# Patient Record
Sex: FEMALE | Race: WHITE | Employment: OTHER | URBAN - METROPOLITAN AREA
[De-identification: names, ages, dates, MRNs, and addresses within clinical notes are randomized per-mention and may not be internally consistent; named-entity substitution may affect disease eponyms.]

---

## 2024-01-01 ENCOUNTER — APPOINTMENT (OUTPATIENT)
Dept: CT IMAGING | Facility: HOSPITAL | Age: 89
End: 2024-01-01
Attending: INTERNAL MEDICINE
Payer: MEDICARE

## 2024-01-01 ENCOUNTER — HOSPITAL ENCOUNTER (INPATIENT)
Facility: HOSPITAL | Age: 89
LOS: 3 days | End: 2024-01-01
Attending: EMERGENCY MEDICINE | Admitting: INTERNAL MEDICINE
Payer: MEDICARE

## 2024-01-01 ENCOUNTER — APPOINTMENT (OUTPATIENT)
Dept: INTERVENTIONAL RADIOLOGY/VASCULAR | Facility: HOSPITAL | Age: 89
End: 2024-01-01
Attending: SURGERY
Payer: MEDICARE

## 2024-01-01 ENCOUNTER — APPOINTMENT (OUTPATIENT)
Dept: GENERAL RADIOLOGY | Facility: HOSPITAL | Age: 89
End: 2024-01-01
Attending: INTERNAL MEDICINE
Payer: MEDICARE

## 2024-01-01 ENCOUNTER — APPOINTMENT (OUTPATIENT)
Dept: CV DIAGNOSTICS | Facility: HOSPITAL | Age: 89
End: 2024-01-01
Attending: HOSPITALIST
Payer: MEDICARE

## 2024-01-01 ENCOUNTER — APPOINTMENT (OUTPATIENT)
Dept: CT IMAGING | Facility: HOSPITAL | Age: 89
End: 2024-01-01
Attending: EMERGENCY MEDICINE
Payer: MEDICARE

## 2024-01-01 ENCOUNTER — ANESTHESIA (OUTPATIENT)
Dept: INTERVENTIONAL RADIOLOGY/VASCULAR | Facility: HOSPITAL | Age: 89
End: 2024-01-01
Payer: MEDICARE

## 2024-01-01 VITALS
BODY MASS INDEX: 28.35 KG/M2 | OXYGEN SATURATION: 92 % | RESPIRATION RATE: 27 BRPM | TEMPERATURE: 95 F | DIASTOLIC BLOOD PRESSURE: 38 MMHG | WEIGHT: 144.38 LBS | HEIGHT: 60 IN | SYSTOLIC BLOOD PRESSURE: 52 MMHG

## 2024-01-01 DIAGNOSIS — R11.2 NAUSEA VOMITING AND DIARRHEA: ICD-10-CM

## 2024-01-01 DIAGNOSIS — I70.8 OCCLUSION OF CELIAC ARTERY: ICD-10-CM

## 2024-01-01 DIAGNOSIS — R19.7 NAUSEA VOMITING AND DIARRHEA: ICD-10-CM

## 2024-01-01 DIAGNOSIS — I71.02 ABDOMINAL AORTIC ANEURYSM DISSECTION (HCC): ICD-10-CM

## 2024-01-01 DIAGNOSIS — I71.00 INTRAMURAL AORTIC HEMATOMA (HCC): Primary | ICD-10-CM

## 2024-01-01 DIAGNOSIS — K55.069 SUPERIOR MESENTERIC ARTERY THROMBOSIS (HCC): ICD-10-CM

## 2024-01-01 LAB
ALBUMIN SERPL-MCNC: 3.2 G/DL (ref 3.2–4.8)
ALBUMIN SERPL-MCNC: 3.5 G/DL (ref 3.2–4.8)
ALBUMIN SERPL-MCNC: 4.5 G/DL (ref 3.2–4.8)
ALBUMIN/GLOB SERPL: 1.6 {RATIO} (ref 1–2)
ALBUMIN/GLOB SERPL: 1.6 {RATIO} (ref 1–2)
ALP LIVER SERPL-CCNC: 60 U/L
ALP LIVER SERPL-CCNC: 62 U/L
ALP LIVER SERPL-CCNC: 62 U/L
ALT SERPL-CCNC: 1389 U/L
ALT SERPL-CCNC: 1633 U/L
ALT SERPL-CCNC: 54 U/L
ALT SERPL-CCNC: 63 U/L
ANION GAP SERPL CALC-SCNC: 10 MMOL/L (ref 0–18)
ANION GAP SERPL CALC-SCNC: 14 MMOL/L (ref 0–18)
ANION GAP SERPL CALC-SCNC: 17 MMOL/L (ref 0–18)
ANION GAP SERPL CALC-SCNC: 9 MMOL/L (ref 0–18)
ANTIBODY SCREEN: NEGATIVE
APTT PPP: 113.6 SECONDS (ref 23.3–35.6)
APTT PPP: 166.4 SECONDS (ref 23.3–35.6)
APTT PPP: 206.1 SECONDS (ref 23.3–35.6)
APTT PPP: 28.5 SECONDS (ref 23.3–35.6)
APTT PPP: 92.2 SECONDS (ref 23.3–35.6)
APTT PPP: >240 SECONDS (ref 23.3–35.6)
AST SERPL-CCNC: 1652 U/L (ref ?–34)
AST SERPL-CCNC: 3079 U/L (ref ?–34)
AST SERPL-CCNC: 96 U/L (ref ?–34)
ATRIAL RATE: 105 BPM
ATRIAL RATE: 97 BPM
BASOPHILS # BLD AUTO: 0.02 X10(3) UL (ref 0–0.2)
BASOPHILS # BLD AUTO: 0.04 X10(3) UL (ref 0–0.2)
BASOPHILS NFR BLD AUTO: 0.2 %
BASOPHILS NFR BLD AUTO: 0.3 %
BILIRUB DIRECT SERPL-MCNC: 0.5 MG/DL (ref ?–0.3)
BILIRUB DIRECT SERPL-MCNC: 0.6 MG/DL (ref ?–0.3)
BILIRUB SERPL-MCNC: 1.7 MG/DL (ref 0.2–1.1)
BILIRUB SERPL-MCNC: 2 MG/DL (ref 0.2–1.1)
BILIRUB SERPL-MCNC: 2.5 MG/DL (ref 0.2–1.1)
BUN BLD-MCNC: 21 MG/DL (ref 9–23)
BUN BLD-MCNC: 21 MG/DL (ref 9–23)
BUN BLD-MCNC: 22 MG/DL (ref 9–23)
BUN BLD-MCNC: 22 MG/DL (ref 9–23)
BUN BLD-MCNC: 32 MG/DL (ref 9–23)
BUN/CREAT SERPL: 30.1 (ref 10–20)
BUN/CREAT SERPL: 34.4 (ref 10–20)
BUN/CREAT SERPL: 41 (ref 10–20)
C DIFF TOX B STL QL: NEGATIVE
CALCIUM BLD-MCNC: 8 MG/DL (ref 8.7–10.4)
CALCIUM BLD-MCNC: 8.3 MG/DL (ref 8.7–10.4)
CALCIUM BLD-MCNC: 8.6 MG/DL (ref 8.7–10.4)
CALCIUM BLD-MCNC: 9.3 MG/DL (ref 8.7–10.4)
CHLORIDE SERPL-SCNC: 108 MMOL/L (ref 98–112)
CHLORIDE SERPL-SCNC: 109 MMOL/L (ref 98–112)
CHLORIDE SERPL-SCNC: 111 MMOL/L (ref 98–112)
CHLORIDE SERPL-SCNC: 113 MMOL/L (ref 98–112)
CHOLEST SERPL-MCNC: 166 MG/DL (ref ?–200)
CO2 SERPL-SCNC: 14 MMOL/L (ref 21–32)
CO2 SERPL-SCNC: 18 MMOL/L (ref 21–32)
CO2 SERPL-SCNC: 19 MMOL/L (ref 21–32)
CO2 SERPL-SCNC: 20 MMOL/L (ref 21–32)
CREAT BLD-MCNC: 0.64 MG/DL
CREAT BLD-MCNC: 0.73 MG/DL
CREAT BLD-MCNC: 0.75 MG/DL
CREAT BLD-MCNC: 0.78 MG/DL
DEPRECATED RDW RBC AUTO: 43.2 FL (ref 35.1–46.3)
DEPRECATED RDW RBC AUTO: 43.8 FL (ref 35.1–46.3)
DEPRECATED RDW RBC AUTO: 45.6 FL (ref 35.1–46.3)
DEPRECATED RDW RBC AUTO: 45.9 FL (ref 35.1–46.3)
DEPRECATED RDW RBC AUTO: 47.2 FL (ref 35.1–46.3)
EGFRCR SERPLBLD CKD-EPI 2021: 73 ML/MIN/1.73M2 (ref 60–?)
EGFRCR SERPLBLD CKD-EPI 2021: 77 ML/MIN/1.73M2 (ref 60–?)
EGFRCR SERPLBLD CKD-EPI 2021: 79 ML/MIN/1.73M2 (ref 60–?)
EGFRCR SERPLBLD CKD-EPI 2021: 85 ML/MIN/1.73M2 (ref 60–?)
EOSINOPHIL # BLD AUTO: 0 X10(3) UL (ref 0–0.7)
EOSINOPHIL # BLD AUTO: 0.02 X10(3) UL (ref 0–0.7)
EOSINOPHIL NFR BLD AUTO: 0 %
EOSINOPHIL NFR BLD AUTO: 0.1 %
ERYTHROCYTE [DISTWIDTH] IN BLOOD BY AUTOMATED COUNT: 13.5 % (ref 11–15)
ERYTHROCYTE [DISTWIDTH] IN BLOOD BY AUTOMATED COUNT: 13.5 % (ref 11–15)
ERYTHROCYTE [DISTWIDTH] IN BLOOD BY AUTOMATED COUNT: 13.7 % (ref 11–15)
ERYTHROCYTE [DISTWIDTH] IN BLOOD BY AUTOMATED COUNT: 14.5 % (ref 11–15)
ERYTHROCYTE [DISTWIDTH] IN BLOOD BY AUTOMATED COUNT: 15 % (ref 11–15)
GLOBULIN PLAS-MCNC: 2 G/DL (ref 2.8–4.4)
GLOBULIN PLAS-MCNC: 2.2 G/DL (ref 2.8–4.4)
GLUCOSE BLD-MCNC: 107 MG/DL (ref 70–99)
GLUCOSE BLD-MCNC: 138 MG/DL (ref 70–99)
GLUCOSE BLD-MCNC: 154 MG/DL (ref 70–99)
GLUCOSE BLD-MCNC: 94 MG/DL (ref 70–99)
HCT VFR BLD AUTO: 44.1 %
HCT VFR BLD AUTO: 44.2 %
HCT VFR BLD AUTO: 44.4 %
HCT VFR BLD AUTO: 45.4 %
HCT VFR BLD AUTO: 46.7 %
HDLC SERPL-MCNC: 64 MG/DL (ref 40–59)
HEMOCCULT STL QL: NEGATIVE
HGB BLD-MCNC: 13.5 G/DL
HGB BLD-MCNC: 13.8 G/DL
HGB BLD-MCNC: 14 G/DL
HGB BLD-MCNC: 14.4 G/DL
HGB BLD-MCNC: 14.7 G/DL
IMM GRANULOCYTES # BLD AUTO: 0.03 X10(3) UL (ref 0–1)
IMM GRANULOCYTES # BLD AUTO: 0.23 X10(3) UL (ref 0–1)
IMM GRANULOCYTES NFR BLD: 0.4 %
IMM GRANULOCYTES NFR BLD: 1.5 %
INR BLD: 1.78 (ref 0.8–1.2)
LACTATE SERPL-SCNC: 10.3 MMOL/L (ref 0.5–2)
LACTATE SERPL-SCNC: 4.6 MMOL/L (ref 0.5–2)
LACTATE SERPL-SCNC: 6.1 MMOL/L (ref 0.5–2)
LACTATE SERPL-SCNC: 8.5 MMOL/L (ref 0.5–2)
LDLC SERPL CALC-MCNC: 86 MG/DL (ref ?–100)
LIPASE SERPL-CCNC: 56 U/L (ref 13–75)
LYMPHOCYTES # BLD AUTO: 0.82 X10(3) UL (ref 1–4)
LYMPHOCYTES # BLD AUTO: 0.99 X10(3) UL (ref 1–4)
LYMPHOCYTES NFR BLD AUTO: 11.9 %
LYMPHOCYTES NFR BLD AUTO: 5.3 %
MAGNESIUM SERPL-MCNC: 2.1 MG/DL (ref 1.6–2.6)
MCH RBC QN AUTO: 27.6 PG (ref 26–34)
MCH RBC QN AUTO: 27.6 PG (ref 26–34)
MCH RBC QN AUTO: 27.9 PG (ref 26–34)
MCH RBC QN AUTO: 27.9 PG (ref 26–34)
MCH RBC QN AUTO: 28.1 PG (ref 26–34)
MCHC RBC AUTO-ENTMCNC: 30.4 G/DL (ref 31–37)
MCHC RBC AUTO-ENTMCNC: 31.3 G/DL (ref 31–37)
MCHC RBC AUTO-ENTMCNC: 31.5 G/DL (ref 31–37)
MCHC RBC AUTO-ENTMCNC: 31.7 G/DL (ref 31–37)
MCHC RBC AUTO-ENTMCNC: 31.7 G/DL (ref 31–37)
MCV RBC AUTO: 87 FL
MCV RBC AUTO: 88 FL
MCV RBC AUTO: 88.2 FL
MCV RBC AUTO: 88.6 FL
MCV RBC AUTO: 92.5 FL
MONOCYTES # BLD AUTO: 0.15 X10(3) UL (ref 0.1–1)
MONOCYTES # BLD AUTO: 1.64 X10(3) UL (ref 0.1–1)
MONOCYTES NFR BLD AUTO: 1.8 %
MONOCYTES NFR BLD AUTO: 10.6 %
MRSA DNA SPEC QL NAA+PROBE: NEGATIVE
NEUTROPHILS # BLD AUTO: 12.73 X10 (3) UL (ref 1.5–7.7)
NEUTROPHILS # BLD AUTO: 12.73 X10(3) UL (ref 1.5–7.7)
NEUTROPHILS # BLD AUTO: 7.14 X10 (3) UL (ref 1.5–7.7)
NEUTROPHILS # BLD AUTO: 7.14 X10(3) UL (ref 1.5–7.7)
NEUTROPHILS NFR BLD AUTO: 82.2 %
NEUTROPHILS NFR BLD AUTO: 85.7 %
NONHDLC SERPL-MCNC: 102 MG/DL (ref ?–130)
OSMOLALITY SERPL CALC.SUM OF ELEC: 297 MOSM/KG (ref 275–295)
OSMOLALITY SERPL CALC.SUM OF ELEC: 297 MOSM/KG (ref 275–295)
OSMOLALITY SERPL CALC.SUM OF ELEC: 300 MOSM/KG (ref 275–295)
P AXIS: 83 DEGREES
P AXIS: 88 DEGREES
P-R INTERVAL: 142 MS
P-R INTERVAL: 154 MS
PLATELET # BLD AUTO: 110 10(3)UL (ref 150–450)
PLATELET # BLD AUTO: 119 10(3)UL (ref 150–450)
PLATELET # BLD AUTO: 130 10(3)UL (ref 150–450)
PLATELET # BLD AUTO: 165 10(3)UL (ref 150–450)
PLATELET # BLD AUTO: 178 10(3)UL (ref 150–450)
PLATELETS.RETICULATED NFR BLD AUTO: 10.4 % (ref 0–7)
PLATELETS.RETICULATED NFR BLD AUTO: 16.9 % (ref 0–7)
PLATELETS.RETICULATED NFR BLD AUTO: 9.6 % (ref 0–7)
PMV BLD AUTO: 13.5 FL (ref 6.8–10.3)
POTASSIUM SERPL-SCNC: 3.6 MMOL/L (ref 3.5–5.1)
POTASSIUM SERPL-SCNC: 3.7 MMOL/L (ref 3.5–5.1)
POTASSIUM SERPL-SCNC: 3.8 MMOL/L (ref 3.5–5.1)
PROT SERPL-MCNC: 5.2 G/DL (ref 5.7–8.2)
PROT SERPL-MCNC: 5.7 G/DL (ref 5.7–8.2)
PROT SERPL-MCNC: 7.3 G/DL (ref 5.7–8.2)
PROTHROMBIN TIME: 21.8 SECONDS (ref 11.6–14.8)
Q-T INTERVAL: 272 MS
Q-T INTERVAL: 358 MS
Q-T INTERVAL: 406 MS
QRS DURATION: 86 MS
QRS DURATION: 88 MS
QRS DURATION: 96 MS
QTC CALCULATION (BEZET): 418 MS
QTC CALCULATION (BEZET): 473 MS
QTC CALCULATION (BEZET): 515 MS
R AXIS: 16 DEGREES
R AXIS: 2 DEGREES
R AXIS: 20 DEGREES
RBC # BLD AUTO: 4.8 X10(6)UL
RBC # BLD AUTO: 5 X10(6)UL
RBC # BLD AUTO: 5.02 X10(6)UL
RBC # BLD AUTO: 5.22 X10(6)UL
RBC # BLD AUTO: 5.27 X10(6)UL
RH BLOOD TYPE: POSITIVE
RH BLOOD TYPE: POSITIVE
SARS-COV-2 RNA RESP QL NAA+PROBE: NOT DETECTED
SODIUM SERPL-SCNC: 138 MMOL/L (ref 136–145)
SODIUM SERPL-SCNC: 140 MMOL/L (ref 136–145)
SODIUM SERPL-SCNC: 142 MMOL/L (ref 136–145)
SODIUM SERPL-SCNC: 142 MMOL/L (ref 136–145)
T AXIS: -37 DEGREES
T AXIS: 259 DEGREES
T AXIS: 50 DEGREES
TRIGL SERPL-MCNC: 87 MG/DL (ref 30–149)
TROPONIN I SERPL HS-MCNC: 42 NG/L
TROPONIN I SERPL HS-MCNC: 422 NG/L
TROPONIN I SERPL HS-MCNC: 435 NG/L
TROPONIN I SERPL HS-MCNC: 438 NG/L
VENTRICULAR RATE: 105 BPM
VENTRICULAR RATE: 142 BPM
VENTRICULAR RATE: 97 BPM
VLDLC SERPL CALC-MCNC: 14 MG/DL (ref 0–30)
WBC # BLD AUTO: 13.7 X10(3) UL (ref 4–11)
WBC # BLD AUTO: 15.5 X10(3) UL (ref 4–11)
WBC # BLD AUTO: 17.7 X10(3) UL (ref 4–11)
WBC # BLD AUTO: 8.3 X10(3) UL (ref 4–11)
WBC # BLD AUTO: 9.1 X10(3) UL (ref 4–11)

## 2024-01-01 PROCEDURE — 85730 THROMBOPLASTIN TIME PARTIAL: CPT | Performed by: INTERNAL MEDICINE

## 2024-01-01 PROCEDURE — 86901 BLOOD TYPING SEROLOGIC RH(D): CPT | Performed by: SURGERY

## 2024-01-01 PROCEDURE — 80053 COMPREHEN METABOLIC PANEL: CPT | Performed by: INTERNAL MEDICINE

## 2024-01-01 PROCEDURE — 71045 X-RAY EXAM CHEST 1 VIEW: CPT | Performed by: INTERNAL MEDICINE

## 2024-01-01 PROCEDURE — 85027 COMPLETE CBC AUTOMATED: CPT | Performed by: SURGERY

## 2024-01-01 PROCEDURE — 80053 COMPREHEN METABOLIC PANEL: CPT | Performed by: HOSPITALIST

## 2024-01-01 PROCEDURE — 87641 MR-STAPH DNA AMP PROBE: CPT | Performed by: HOSPITALIST

## 2024-01-01 PROCEDURE — 82248 BILIRUBIN DIRECT: CPT | Performed by: INTERNAL MEDICINE

## 2024-01-01 PROCEDURE — 83605 ASSAY OF LACTIC ACID: CPT | Performed by: EMERGENCY MEDICINE

## 2024-01-01 PROCEDURE — 37237 OPEN/PERQ PLACE STENT EA ADD: CPT | Performed by: SURGERY

## 2024-01-01 PROCEDURE — 85730 THROMBOPLASTIN TIME PARTIAL: CPT | Performed by: EMERGENCY MEDICINE

## 2024-01-01 PROCEDURE — 96375 TX/PRO/DX INJ NEW DRUG ADDON: CPT

## 2024-01-01 PROCEDURE — 86901 BLOOD TYPING SEROLOGIC RH(D): CPT | Performed by: EMERGENCY MEDICINE

## 2024-01-01 PROCEDURE — 84484 ASSAY OF TROPONIN QUANT: CPT | Performed by: HOSPITALIST

## 2024-01-01 PROCEDURE — 86850 RBC ANTIBODY SCREEN: CPT | Performed by: EMERGENCY MEDICINE

## 2024-01-01 PROCEDURE — 93010 ELECTROCARDIOGRAM REPORT: CPT | Performed by: INTERNAL MEDICINE

## 2024-01-01 PROCEDURE — B41F1ZZ FLUOROSCOPY OF RIGHT LOWER EXTREMITY ARTERIES USING LOW OSMOLAR CONTRAST: ICD-10-PCS | Performed by: SURGERY

## 2024-01-01 PROCEDURE — 83690 ASSAY OF LIPASE: CPT | Performed by: HOSPITALIST

## 2024-01-01 PROCEDURE — 85610 PROTHROMBIN TIME: CPT | Performed by: HOSPITALIST

## 2024-01-01 PROCEDURE — 80048 BASIC METABOLIC PNL TOTAL CA: CPT | Performed by: EMERGENCY MEDICINE

## 2024-01-01 PROCEDURE — 85025 COMPLETE CBC W/AUTO DIFF WBC: CPT | Performed by: HOSPITALIST

## 2024-01-01 PROCEDURE — 93306 TTE W/DOPPLER COMPLETE: CPT | Performed by: HOSPITALIST

## 2024-01-01 PROCEDURE — X2CP3T7 EXTIRPATION OF MATTER FROM ABDOMINAL AORTA USING COMPUTER-AIDED MECHANICAL ASPIRATION, PERCUTANEOUS APPROACH, NEW TECHNOLOGY GROUP 7: ICD-10-PCS | Performed by: SURGERY

## 2024-01-01 PROCEDURE — X2CY3T7 EXTIRPATION OF MATTER FROM GREAT VESSEL USING COMPUTER-AIDED MECHANICAL ASPIRATION, PERCUTANEOUS APPROACH, NEW TECHNOLOGY GROUP 7: ICD-10-PCS | Performed by: SURGERY

## 2024-01-01 PROCEDURE — 84484 ASSAY OF TROPONIN QUANT: CPT | Performed by: INTERNAL MEDICINE

## 2024-01-01 PROCEDURE — B44FZZ3 ULTRASONOGRAPHY OF RIGHT LOWER EXTREMITY ARTERIES, INTRAVASCULAR: ICD-10-PCS | Performed by: SURGERY

## 2024-01-01 PROCEDURE — 99285 EMERGENCY DEPT VISIT HI MDM: CPT

## 2024-01-01 PROCEDURE — 37184 PRIM ART M-THRMBC 1ST VSL: CPT | Performed by: SURGERY

## 2024-01-01 PROCEDURE — 93005 ELECTROCARDIOGRAM TRACING: CPT

## 2024-01-01 PROCEDURE — 85025 COMPLETE CBC W/AUTO DIFF WBC: CPT | Performed by: EMERGENCY MEDICINE

## 2024-01-01 PROCEDURE — 96361 HYDRATE IV INFUSION ADD-ON: CPT

## 2024-01-01 PROCEDURE — 87427 SHIGA-LIKE TOXIN AG IA: CPT | Performed by: EMERGENCY MEDICINE

## 2024-01-01 PROCEDURE — 37236 OPEN/PERQ PLACE STENT 1ST: CPT | Performed by: SURGERY

## 2024-01-01 PROCEDURE — 85027 COMPLETE CBC AUTOMATED: CPT | Performed by: INTERNAL MEDICINE

## 2024-01-01 PROCEDURE — 83605 ASSAY OF LACTIC ACID: CPT | Performed by: HOSPITALIST

## 2024-01-01 PROCEDURE — 36245 INS CATH ABD/L-EXT ART 1ST: CPT | Performed by: SURGERY

## 2024-01-01 PROCEDURE — 85730 THROMBOPLASTIN TIME PARTIAL: CPT | Performed by: HOSPITALIST

## 2024-01-01 PROCEDURE — 96374 THER/PROPH/DIAG INJ IV PUSH: CPT

## 2024-01-01 PROCEDURE — 87045 FECES CULTURE AEROBIC BACT: CPT | Performed by: EMERGENCY MEDICINE

## 2024-01-01 PROCEDURE — 74177 CT ABD & PELVIS W/CONTRAST: CPT | Performed by: EMERGENCY MEDICINE

## 2024-01-01 PROCEDURE — 85730 THROMBOPLASTIN TIME PARTIAL: CPT | Performed by: SURGERY

## 2024-01-01 PROCEDURE — 85027 COMPLETE CBC AUTOMATED: CPT | Performed by: HOSPITALIST

## 2024-01-01 PROCEDURE — 87493 C DIFF AMPLIFIED PROBE: CPT | Performed by: EMERGENCY MEDICINE

## 2024-01-01 PROCEDURE — 83735 ASSAY OF MAGNESIUM: CPT | Performed by: HOSPITALIST

## 2024-01-01 PROCEDURE — B4141ZZ FLUOROSCOPY OF SUPERIOR MESENTERIC ARTERY USING LOW OSMOLAR CONTRAST: ICD-10-PCS | Performed by: SURGERY

## 2024-01-01 PROCEDURE — 99291 CRITICAL CARE FIRST HOUR: CPT

## 2024-01-01 PROCEDURE — 36247 INS CATH ABD/L-EXT ART 3RD: CPT | Performed by: SURGERY

## 2024-01-01 PROCEDURE — 83605 ASSAY OF LACTIC ACID: CPT | Performed by: SURGERY

## 2024-01-01 PROCEDURE — 86900 BLOOD TYPING SEROLOGIC ABO: CPT | Performed by: SURGERY

## 2024-01-01 PROCEDURE — 80076 HEPATIC FUNCTION PANEL: CPT | Performed by: EMERGENCY MEDICINE

## 2024-01-01 PROCEDURE — 82272 OCCULT BLD FECES 1-3 TESTS: CPT | Performed by: EMERGENCY MEDICINE

## 2024-01-01 PROCEDURE — 83690 ASSAY OF LIPASE: CPT | Performed by: EMERGENCY MEDICINE

## 2024-01-01 PROCEDURE — 87077 CULTURE AEROBIC IDENTIFY: CPT | Performed by: EMERGENCY MEDICINE

## 2024-01-01 PROCEDURE — 80048 BASIC METABOLIC PNL TOTAL CA: CPT | Performed by: SURGERY

## 2024-01-01 PROCEDURE — 74174 CTA ABD&PLVS W/CONTRAST: CPT | Performed by: INTERNAL MEDICINE

## 2024-01-01 PROCEDURE — 87046 STOOL CULTR AEROBIC BACT EA: CPT | Performed by: EMERGENCY MEDICINE

## 2024-01-01 PROCEDURE — 96376 TX/PRO/DX INJ SAME DRUG ADON: CPT

## 2024-01-01 PROCEDURE — 37185 PRIM ART M-THRMBC SBSQ VSL: CPT | Performed by: SURGERY

## 2024-01-01 PROCEDURE — 37252 INTRVASC US NONCORONARY 1ST: CPT | Performed by: SURGERY

## 2024-01-01 PROCEDURE — 86850 RBC ANTIBODY SCREEN: CPT | Performed by: SURGERY

## 2024-01-01 PROCEDURE — 83735 ASSAY OF MAGNESIUM: CPT | Performed by: EMERGENCY MEDICINE

## 2024-01-01 PROCEDURE — 80061 LIPID PANEL: CPT | Performed by: INTERNAL MEDICINE

## 2024-01-01 PROCEDURE — X2CS3T7 EXTIRPATION OF MATTER FROM RIGHT LOWER EXTREMITY ARTERY USING COMPUTER-AIDED MECHANICAL ASPIRATION, PERCUTANEOUS APPROACH, NEW TECHNOLOGY GROUP 7: ICD-10-PCS | Performed by: SURGERY

## 2024-01-01 PROCEDURE — 04753DZ DILATION OF SUPERIOR MESENTERIC ARTERY WITH INTRALUMINAL DEVICE, PERCUTANEOUS APPROACH: ICD-10-PCS | Performed by: SURGERY

## 2024-01-01 PROCEDURE — 86900 BLOOD TYPING SEROLOGIC ABO: CPT | Performed by: EMERGENCY MEDICINE

## 2024-01-01 RX ORDER — METOPROLOL TARTRATE 1 MG/ML
5 INJECTION, SOLUTION INTRAVENOUS EVERY 4 HOURS PRN
Status: DISCONTINUED | OUTPATIENT
Start: 2024-01-01 | End: 2024-01-01

## 2024-01-01 RX ORDER — METOCLOPRAMIDE HYDROCHLORIDE 5 MG/ML
5 INJECTION INTRAMUSCULAR; INTRAVENOUS EVERY 8 HOURS PRN
Status: DISCONTINUED | OUTPATIENT
Start: 2024-01-01 | End: 2024-01-01

## 2024-01-01 RX ORDER — MORPHINE SULFATE 2 MG/ML
2 INJECTION, SOLUTION INTRAMUSCULAR; INTRAVENOUS EVERY 2 HOUR PRN
Status: DISCONTINUED | OUTPATIENT
Start: 2024-01-01 | End: 2024-01-01

## 2024-01-01 RX ORDER — HEPARIN SODIUM 1000 [USP'U]/ML
INJECTION, SOLUTION INTRAVENOUS; SUBCUTANEOUS AS NEEDED
Status: DISCONTINUED | OUTPATIENT
Start: 2024-01-01 | End: 2024-01-01 | Stop reason: SURG

## 2024-01-01 RX ORDER — ENEMA 19; 7 G/133ML; G/133ML
1 ENEMA RECTAL ONCE AS NEEDED
Status: DISCONTINUED | OUTPATIENT
Start: 2024-01-01 | End: 2024-01-01

## 2024-01-01 RX ORDER — HALOPERIDOL 5 MG/ML
0.5 INJECTION INTRAMUSCULAR ONCE
Status: COMPLETED | OUTPATIENT
Start: 2024-01-01 | End: 2024-01-01

## 2024-01-01 RX ORDER — PROTAMINE SULFATE 10 MG/ML
INJECTION, SOLUTION INTRAVENOUS
Status: COMPLETED
Start: 2024-01-01 | End: 2024-01-01

## 2024-01-01 RX ORDER — ACETAMINOPHEN 10 MG/ML
1000 INJECTION, SOLUTION INTRAVENOUS EVERY 6 HOURS PRN
Status: DISCONTINUED | OUTPATIENT
Start: 2024-01-01 | End: 2024-01-01

## 2024-01-01 RX ORDER — MORPHINE SULFATE 4 MG/ML
4 INJECTION, SOLUTION INTRAMUSCULAR; INTRAVENOUS EVERY 2 HOUR PRN
Status: DISCONTINUED | OUTPATIENT
Start: 2024-01-01 | End: 2024-01-01

## 2024-01-01 RX ORDER — MORPHINE SULFATE 4 MG/ML
4 INJECTION, SOLUTION INTRAMUSCULAR; INTRAVENOUS ONCE
Status: COMPLETED | OUTPATIENT
Start: 2024-01-01 | End: 2024-01-01

## 2024-01-01 RX ORDER — MORPHINE SULFATE 2 MG/ML
INJECTION, SOLUTION INTRAMUSCULAR; INTRAVENOUS
Status: COMPLETED
Start: 2024-01-01 | End: 2024-01-01

## 2024-01-01 RX ORDER — HEPARIN SODIUM AND DEXTROSE 10000; 5 [USP'U]/100ML; G/100ML
INJECTION INTRAVENOUS CONTINUOUS
Status: DISCONTINUED | OUTPATIENT
Start: 2024-01-01 | End: 2024-01-01

## 2024-01-01 RX ORDER — CLOPIDOGREL BISULFATE 75 MG/1
75 TABLET ORAL DAILY
Status: DISCONTINUED | OUTPATIENT
Start: 2024-01-01 | End: 2024-01-01

## 2024-01-01 RX ORDER — MORPHINE SULFATE 2 MG/ML
2 INJECTION, SOLUTION INTRAMUSCULAR; INTRAVENOUS EVERY 10 MIN PRN
Status: DISCONTINUED | OUTPATIENT
Start: 2024-01-01 | End: 2024-01-01

## 2024-01-01 RX ORDER — CALCIUM CARBONATE 1250 MG/5ML
5 SUSPENSION ORAL ONCE
Status: COMPLETED | OUTPATIENT
Start: 2024-01-01 | End: 2024-01-01

## 2024-01-01 RX ORDER — SODIUM CHLORIDE, SODIUM LACTATE, POTASSIUM CHLORIDE, CALCIUM CHLORIDE 600; 310; 30; 20 MG/100ML; MG/100ML; MG/100ML; MG/100ML
INJECTION, SOLUTION INTRAVENOUS CONTINUOUS
Status: DISCONTINUED | OUTPATIENT
Start: 2024-01-01 | End: 2024-01-01

## 2024-01-01 RX ORDER — SENNOSIDES 8.6 MG
17.2 TABLET ORAL NIGHTLY PRN
Status: DISCONTINUED | OUTPATIENT
Start: 2024-01-01 | End: 2024-01-01

## 2024-01-01 RX ORDER — SODIUM CHLORIDE 9 MG/ML
INJECTION, SOLUTION INTRAVENOUS CONTINUOUS PRN
Status: DISCONTINUED | OUTPATIENT
Start: 2024-01-01 | End: 2024-01-01 | Stop reason: SURG

## 2024-01-01 RX ORDER — HEPARIN SODIUM AND DEXTROSE 10000; 5 [USP'U]/100ML; G/100ML
18 INJECTION INTRAVENOUS ONCE
Status: DISCONTINUED | OUTPATIENT
Start: 2024-01-01 | End: 2024-01-01

## 2024-01-01 RX ORDER — HEPARIN SODIUM 5000 [USP'U]/ML
5000 INJECTION, SOLUTION INTRAVENOUS; SUBCUTANEOUS EVERY 8 HOURS SCHEDULED
Status: DISCONTINUED | OUTPATIENT
Start: 2024-01-01 | End: 2024-01-01 | Stop reason: ALTCHOICE

## 2024-01-01 RX ORDER — MORPHINE SULFATE 2 MG/ML
2 INJECTION, SOLUTION INTRAMUSCULAR; INTRAVENOUS EVERY 2 HOUR PRN
Status: DISCONTINUED | OUTPATIENT
Start: 2024-01-01 | End: 2024-01-01 | Stop reason: ALTCHOICE

## 2024-01-01 RX ORDER — DILTIAZEM HYDROCHLORIDE 5 MG/ML
10 INJECTION INTRAVENOUS ONCE
Status: COMPLETED | OUTPATIENT
Start: 2024-01-01 | End: 2024-01-01

## 2024-01-01 RX ORDER — NITROGLYCERIN 20 MG/100ML
INJECTION INTRAVENOUS CONTINUOUS
Status: DISCONTINUED | OUTPATIENT
Start: 2024-01-01 | End: 2024-01-01

## 2024-01-01 RX ORDER — HEPARIN SODIUM AND DEXTROSE 10000; 5 [USP'U]/100ML; G/100ML
18 INJECTION INTRAVENOUS ONCE
Status: COMPLETED | OUTPATIENT
Start: 2024-01-01 | End: 2024-01-01

## 2024-01-01 RX ORDER — NALOXONE HYDROCHLORIDE 0.4 MG/ML
0.08 INJECTION, SOLUTION INTRAMUSCULAR; INTRAVENOUS; SUBCUTANEOUS AS NEEDED
Status: DISCONTINUED | OUTPATIENT
Start: 2024-01-01 | End: 2024-01-01

## 2024-01-01 RX ORDER — SODIUM CHLORIDE 9 MG/ML
INJECTION, SOLUTION INTRAVENOUS CONTINUOUS
Status: DISCONTINUED | OUTPATIENT
Start: 2024-01-01 | End: 2024-01-01

## 2024-01-01 RX ORDER — ONDANSETRON 2 MG/ML
4 INJECTION INTRAMUSCULAR; INTRAVENOUS ONCE
Status: COMPLETED | OUTPATIENT
Start: 2024-01-01 | End: 2024-01-01

## 2024-01-01 RX ORDER — CEFAZOLIN SODIUM/WATER 2 G/20 ML
SYRINGE (ML) INTRAVENOUS AS NEEDED
Status: DISCONTINUED | OUTPATIENT
Start: 2024-01-01 | End: 2024-01-01 | Stop reason: SURG

## 2024-01-01 RX ORDER — MORPHINE SULFATE 4 MG/ML
4 INJECTION, SOLUTION INTRAMUSCULAR; INTRAVENOUS EVERY 2 HOUR PRN
Status: DISCONTINUED | OUTPATIENT
Start: 2024-01-01 | End: 2024-01-01 | Stop reason: ALTCHOICE

## 2024-01-01 RX ORDER — MORPHINE SULFATE 2 MG/ML
1 INJECTION, SOLUTION INTRAMUSCULAR; INTRAVENOUS EVERY 2 HOUR PRN
Status: DISCONTINUED | OUTPATIENT
Start: 2024-01-01 | End: 2024-01-01 | Stop reason: ALTCHOICE

## 2024-01-01 RX ORDER — DOPAMINE HYDROCHLORIDE 320 MG/100ML
INJECTION, SOLUTION INTRAVENOUS CONTINUOUS
Status: DISCONTINUED | OUTPATIENT
Start: 2024-01-01 | End: 2024-01-01

## 2024-01-01 RX ORDER — HYDRALAZINE HYDROCHLORIDE 20 MG/ML
5 INJECTION INTRAMUSCULAR; INTRAVENOUS EVERY 6 HOURS PRN
Status: DISCONTINUED | OUTPATIENT
Start: 2024-01-01 | End: 2024-01-01

## 2024-01-01 RX ORDER — MORPHINE SULFATE 4 MG/ML
4 INJECTION, SOLUTION INTRAMUSCULAR; INTRAVENOUS EVERY 10 MIN PRN
Status: DISCONTINUED | OUTPATIENT
Start: 2024-01-01 | End: 2024-01-01

## 2024-01-01 RX ORDER — HEPARIN SODIUM 1000 [USP'U]/ML
INJECTION, SOLUTION INTRAVENOUS; SUBCUTANEOUS
Status: COMPLETED
Start: 2024-01-01 | End: 2024-01-01

## 2024-01-01 RX ORDER — ONDANSETRON 2 MG/ML
4 INJECTION INTRAMUSCULAR; INTRAVENOUS EVERY 6 HOURS PRN
Status: DISCONTINUED | OUTPATIENT
Start: 2024-01-01 | End: 2024-01-01

## 2024-01-01 RX ORDER — MORPHINE SULFATE 10 MG/ML
6 INJECTION, SOLUTION INTRAMUSCULAR; INTRAVENOUS EVERY 10 MIN PRN
Status: DISCONTINUED | OUTPATIENT
Start: 2024-01-01 | End: 2024-01-01

## 2024-01-01 RX ORDER — MORPHINE SULFATE 4 MG/ML
4 INJECTION, SOLUTION INTRAMUSCULAR; INTRAVENOUS EVERY 30 MIN PRN
Status: DISCONTINUED | OUTPATIENT
Start: 2024-01-01 | End: 2024-01-01 | Stop reason: ALTCHOICE

## 2024-01-01 RX ORDER — CEFAZOLIN SODIUM/WATER 2 G/20 ML
SYRINGE (ML) INTRAVENOUS
Status: COMPLETED
Start: 2024-01-01 | End: 2024-01-01

## 2024-01-01 RX ORDER — HEPARIN SODIUM 1000 [USP'U]/ML
80 INJECTION, SOLUTION INTRAVENOUS; SUBCUTANEOUS ONCE
Status: DISCONTINUED | OUTPATIENT
Start: 2024-01-01 | End: 2024-01-01

## 2024-01-01 RX ORDER — MORPHINE SULFATE 2 MG/ML
1 INJECTION, SOLUTION INTRAMUSCULAR; INTRAVENOUS EVERY 2 HOUR PRN
Status: DISCONTINUED | OUTPATIENT
Start: 2024-01-01 | End: 2024-01-01

## 2024-01-01 RX ORDER — BISACODYL 10 MG
10 SUPPOSITORY, RECTAL RECTAL
Status: DISCONTINUED | OUTPATIENT
Start: 2024-01-01 | End: 2024-01-01

## 2024-01-01 RX ORDER — ATORVASTATIN CALCIUM 80 MG/1
80 TABLET, FILM COATED ORAL NIGHTLY
Status: DISCONTINUED | OUTPATIENT
Start: 2024-01-01 | End: 2024-01-01

## 2024-01-01 RX ORDER — HYDROMORPHONE HYDROCHLORIDE 1 MG/ML
0.2 INJECTION, SOLUTION INTRAMUSCULAR; INTRAVENOUS; SUBCUTANEOUS EVERY 5 MIN PRN
Status: DISCONTINUED | OUTPATIENT
Start: 2024-01-01 | End: 2024-01-01

## 2024-01-01 RX ORDER — LISINOPRIL 10 MG/1
10 TABLET ORAL DAILY
Status: DISCONTINUED | OUTPATIENT
Start: 2024-01-01 | End: 2024-01-01

## 2024-01-01 RX ORDER — KETOROLAC TROMETHAMINE 15 MG/ML
15 INJECTION, SOLUTION INTRAMUSCULAR; INTRAVENOUS ONCE
Status: COMPLETED | OUTPATIENT
Start: 2024-01-01 | End: 2024-01-01

## 2024-01-01 RX ORDER — ONDANSETRON 2 MG/ML
4 INJECTION INTRAMUSCULAR; INTRAVENOUS EVERY 4 HOURS PRN
Status: DISCONTINUED | OUTPATIENT
Start: 2024-01-01 | End: 2024-01-01 | Stop reason: ALTCHOICE

## 2024-01-01 RX ORDER — HYDROMORPHONE HYDROCHLORIDE 1 MG/ML
0.4 INJECTION, SOLUTION INTRAMUSCULAR; INTRAVENOUS; SUBCUTANEOUS EVERY 5 MIN PRN
Status: DISCONTINUED | OUTPATIENT
Start: 2024-01-01 | End: 2024-01-01

## 2024-01-01 RX ORDER — MORPHINE SULFATE 2 MG/ML
2 INJECTION, SOLUTION INTRAMUSCULAR; INTRAVENOUS ONCE
Status: COMPLETED | OUTPATIENT
Start: 2024-01-01 | End: 2024-01-01

## 2024-01-01 RX ORDER — POLYETHYLENE GLYCOL 3350 17 G/17G
17 POWDER, FOR SOLUTION ORAL DAILY PRN
Status: DISCONTINUED | OUTPATIENT
Start: 2024-01-01 | End: 2024-01-01

## 2024-01-01 RX ORDER — CLOPIDOGREL BISULFATE 75 MG/1
300 TABLET ORAL ONCE
Status: DISCONTINUED | OUTPATIENT
Start: 2024-01-01 | End: 2024-01-01

## 2024-01-01 RX ORDER — SODIUM CHLORIDE 9 MG/ML
125 INJECTION, SOLUTION INTRAVENOUS CONTINUOUS
Status: DISCONTINUED | OUTPATIENT
Start: 2024-01-01 | End: 2024-01-01

## 2024-01-01 RX ORDER — SOTALOL HYDROCHLORIDE 80 MG/1
80 TABLET ORAL DAILY
Status: DISCONTINUED | OUTPATIENT
Start: 2024-01-01 | End: 2024-01-01

## 2024-01-01 RX ORDER — HYDROMORPHONE HYDROCHLORIDE 1 MG/ML
0.6 INJECTION, SOLUTION INTRAMUSCULAR; INTRAVENOUS; SUBCUTANEOUS EVERY 5 MIN PRN
Status: DISCONTINUED | OUTPATIENT
Start: 2024-01-01 | End: 2024-01-01

## 2024-01-01 RX ORDER — LIDOCAINE HYDROCHLORIDE 20 MG/ML
INJECTION, SOLUTION EPIDURAL; INFILTRATION; INTRACAUDAL; PERINEURAL
Status: COMPLETED
Start: 2024-01-01 | End: 2024-01-01

## 2024-01-01 RX ADMIN — HEPARIN SODIUM 2000 UNITS: 1000 INJECTION, SOLUTION INTRAVENOUS; SUBCUTANEOUS at 18:01:00

## 2024-01-01 RX ADMIN — SODIUM CHLORIDE: 9 INJECTION, SOLUTION INTRAVENOUS at 16:47:00

## 2024-01-01 RX ADMIN — CEFAZOLIN SODIUM/WATER 2 G: 2 G/20 ML SYRINGE (ML) INTRAVENOUS at 18:00:00

## 2024-01-01 RX ADMIN — HEPARIN SODIUM 5000 UNITS: 1000 INJECTION, SOLUTION INTRAVENOUS; SUBCUTANEOUS at 17:04:00

## 2024-01-01 RX ADMIN — SODIUM CHLORIDE: 9 INJECTION, SOLUTION INTRAVENOUS at 19:36:00

## 2024-01-01 RX ADMIN — SODIUM CHLORIDE: 9 INJECTION, SOLUTION INTRAVENOUS at 18:27:00

## 2024-03-18 ENCOUNTER — APPOINTMENT (OUTPATIENT)
Dept: ULTRASOUND IMAGING | Facility: HOSPITAL | Age: 89
End: 2024-03-18
Attending: EMERGENCY MEDICINE
Payer: MEDICARE

## 2024-03-18 ENCOUNTER — HOSPITAL ENCOUNTER (EMERGENCY)
Facility: HOSPITAL | Age: 89
Discharge: HOME | End: 2024-03-18
Attending: EMERGENCY MEDICINE
Payer: MEDICARE

## 2024-03-18 ENCOUNTER — APPOINTMENT (OUTPATIENT)
Dept: GENERAL RADIOLOGY | Facility: HOSPITAL | Age: 89
End: 2024-03-18
Attending: EMERGENCY MEDICINE
Payer: MEDICARE

## 2024-03-18 VITALS
RESPIRATION RATE: 20 BRPM | DIASTOLIC BLOOD PRESSURE: 41 MMHG | OXYGEN SATURATION: 95 % | TEMPERATURE: 98 F | HEART RATE: 53 BPM | SYSTOLIC BLOOD PRESSURE: 100 MMHG | WEIGHT: 131.19 LBS | HEIGHT: 60 IN | BODY MASS INDEX: 25.76 KG/M2

## 2024-03-18 DIAGNOSIS — M71.22 SYNOVIAL CYST OF LEFT POPLITEAL SPACE: ICD-10-CM

## 2024-03-18 DIAGNOSIS — M25.469 SUPRAPATELLAR EFFUSION OF KNEE: ICD-10-CM

## 2024-03-18 DIAGNOSIS — M25.562 ACUTE PAIN OF LEFT KNEE: Primary | ICD-10-CM

## 2024-03-18 PROCEDURE — 99284 EMERGENCY DEPT VISIT MOD MDM: CPT

## 2024-03-18 PROCEDURE — S0119 ONDANSETRON 4 MG: HCPCS

## 2024-03-18 PROCEDURE — 96372 THER/PROPH/DIAG INJ SC/IM: CPT

## 2024-03-18 PROCEDURE — 93971 EXTREMITY STUDY: CPT | Performed by: EMERGENCY MEDICINE

## 2024-03-18 PROCEDURE — 73560 X-RAY EXAM OF KNEE 1 OR 2: CPT | Performed by: EMERGENCY MEDICINE

## 2024-03-18 RX ORDER — ONDANSETRON 2 MG/ML
4 INJECTION INTRAMUSCULAR; INTRAVENOUS ONCE
Status: DISCONTINUED | OUTPATIENT
Start: 2024-03-18 | End: 2024-03-18

## 2024-03-18 RX ORDER — HYDROCODONE BITARTRATE AND ACETAMINOPHEN 5; 325 MG/1; MG/1
1 TABLET ORAL EVERY 6 HOURS PRN
Qty: 10 TABLET | Refills: 0 | Status: SHIPPED | OUTPATIENT
Start: 2024-03-18 | End: 2024-03-25

## 2024-03-18 RX ORDER — MORPHINE SULFATE 4 MG/ML
4 INJECTION, SOLUTION INTRAMUSCULAR; INTRAVENOUS ONCE
Status: COMPLETED | OUTPATIENT
Start: 2024-03-18 | End: 2024-03-18

## 2024-03-18 RX ORDER — ONDANSETRON 4 MG/1
4 TABLET, ORALLY DISINTEGRATING ORAL EVERY 4 HOURS PRN
Qty: 10 TABLET | Refills: 0 | Status: SHIPPED | OUTPATIENT
Start: 2024-03-18 | End: 2024-03-25

## 2024-03-18 RX ORDER — ONDANSETRON 4 MG/1
TABLET, ORALLY DISINTEGRATING ORAL
Status: COMPLETED
Start: 2024-03-18 | End: 2024-03-18

## 2024-03-18 RX ORDER — ONDANSETRON 4 MG/1
4 TABLET, ORALLY DISINTEGRATING ORAL ONCE
Status: COMPLETED | OUTPATIENT
Start: 2024-03-18 | End: 2024-03-18

## 2024-03-19 NOTE — DISCHARGE INSTRUCTIONS
[facility]: [phone] - call for transportation anywhere  [facility]: [phone]

## 2024-03-19 NOTE — ED PROVIDER NOTES
Patient Seen in: [facility] Emergency Department    History     Chief Complaint   Patient presents with    Deep Vein Thrombosis       HPI    History is provided by patient/independent historian: Patient family providing translation per patient request's   88 year old female with history of arthritis to the left knee and Baker's cyst, here after a day of physical therapy earlier this morning and walking at the mall with the patient's son, and subsequent developing of pain to the left knee and left thigh.  It is causing her to have difficulty walking and needed to be wheeled in.  No known trauma.  No overlying redness.  No fevers.  No numbness.  She was advised by her primary care doctor to take 2 muscle relaxers which she did without improvement of her symptoms.    History reviewed. History reviewed. No pertinent past medical history.      History reviewed. History reviewed. No pertinent surgical history.      Home Medications reviewed :  (Not in a hospital admission)        History reviewed.   Social History     Socioeconomic History    Marital status:    Tobacco Use    Smoking status: Never    Smokeless tobacco: Never   Vaping Use    Vaping Use: Never used   Substance and Sexual Activity    Alcohol use: Never    Drug use: Never         ROS  Review of Systems   Respiratory:  Negative for shortness of breath.    Cardiovascular:  Negative for chest pain.   Musculoskeletal:  Positive for arthralgias and joint swelling.   All other systems reviewed and are negative.     All other pertinent organ systems are reviewed and are negative.      Physical Exam     ED Triage Vitals [03/18/24 1946]   BP (!) 192/70   Pulse 64   Resp 15   Temp 98.2 °F (36.8 °C)   Temp src Temporal   SpO2 97 %   O2 Device None (Room air)     Vital signs reviewed.      Physical Exam  Vitals and nursing note reviewed.   Cardiovascular:      Pulses: Normal pulses.   Pulmonary:      Effort: No respiratory distress.   Abdominal:       General: There is no distension.   Musculoskeletal:      Comments: Left knee effusion noted, warm to touch, able to range passively, quadriceps and patellar tendons intact, left thigh compartments soft, left knee and ankle unremarkable   Neurological:      Mental Status: She is alert.         ED Course       Labs:   Labs Reviewed - No data to display      My EKG Interpretation:   As reviewed and Interpreted by me      Imaging Results Available and Reviewed while in ED:   XR KNEE (1 OR 2 VIEWS), LEFT (CPT=73560)    Result Date: 3/18/2024  CONCLUSION:   No acute fracture or dislocation.  Tricompartmental osteoarthrosis, which is most advanced and mild-to-moderate involving the patellofemoral compartment.  Large suprapatellar joint effusion with soft tissue swelling about the knee.     Dictated by (CST): Angus Parsons MD on 3/18/2024 at 10:42 PM     Finalized by (CST): Angus Parsons MD on 3/18/2024 at 10:44 PM             DOPPLER VENOUS ULTRASOUND LEFT LOWER EXTREMITY     IMPRESSION:    No evidence of deep venous thrombosis involving the left lower extremity.    There is a complex 5.5 x 2.2 by 3.0 cm posterior knee cyst/bakers cyst.    The results were faxed at 11:19 PM central standard time. If you would like to discuss this case directly please call [phone] (extension [phone]).      Marc Gosselin, M.D.  This report has been electronically signed and verified by the Radiologist whose name is printed above.    DD:  03/18/2024/DT:  03/19/2024    My review and independent interpretation of XR, US images: no fracture, no DVT. Radiology report corroborates this in addition to other details as reported by them.      Decision rules/scores evaluated: none      Diagnostic labs/tests considered but not ordered: CBC, BMP, ddimer    ED Medications Administered:   Medications   morphINE PF 4 MG/ML injection 4 mg (4 mg Intramuscular Given 3/18/24 2142)   ondansetron (Zofran-ODT) disintegrating tab 4 mg (4 mg Oral Given 3/18/24  7190)                [facility]       Medical Decision Making      Differential Diagnosis: After obtaining the patient's history, performing the physical exam and reviewing the diagnostics, multiple initial diagnoses were considered based on the presenting problem including DVT, baker's cyst, knee effusion, fracture    External document review: I personally reviewed available external medical records for any recent pertinent discharge summaries, testing, and procedures - the findings are as follows: none available    Complicating Factors: The patient already  has no past medical history on file. to contribute to the complexity of this ED evaluation.    Procedures performed: none    Discussed management with physician/appropriate source:  - providing Talkwheel    Considered admission/deescalation of care for: none    Social determinants of health affecting patient care: none    Prescription medications considered: norco, discussed continuing current medication regimen    The patient requires continuous monitoring for: knee pain    Shared decision making: discussed possible admission        Disposition and Plan     Clinical Impression:  1. Acute pain of left knee    2. Suprapatellar effusion of knee    3. Synovial cyst of left popliteal space        Disposition:  Discharge    Follow-up:  Norman Dickson MD  [address] [place] [id number]  [phone]    Follow up        Medications Prescribed:  Current Discharge Medication List        START taking these medications    Details   !! HYDROcodone-acetaminophen 5-325 MG Oral Tab Take 1 tablet by mouth every 6 (six) hours as needed.  Qty: 10 tablet, Refills: 0    Associated Diagnoses: Acute pain of left knee      diclofenac (VOLTAREN) 1 % External Gel Apply 2 g topically 4 (four) times daily.  Qty: 50 g, Refills: 0      !! HYDROcodone-acetaminophen 5-325 MG Oral Tab Take 1 tablet by mouth every 6 (six) hours as needed.  Qty: 10  tablet, Refills: 0    Associated Diagnoses: Acute pain of left knee       !! - Potential duplicate medications found. Please discuss with provider.

## 2024-03-19 NOTE — ED INITIAL ASSESSMENT (HPI)
Left leg pain that began today, mostly in thigh area. Intermittent in nature. In PT twice weekly. Took Muscle relaxer twice today without relief.

## 2024-04-10 PROBLEM — I70.8 OCCLUSION OF CELIAC ARTERY: Status: ACTIVE | Noted: 2024-01-01

## 2024-04-10 PROBLEM — I71.00 INTRAMURAL AORTIC HEMATOMA (HCC): Status: ACTIVE | Noted: 2024-01-01

## 2024-04-10 PROBLEM — I71.02 ABDOMINAL AORTIC ANEURYSM DISSECTION (HCC): Status: ACTIVE | Noted: 2024-01-01

## 2024-04-10 PROBLEM — K55.069 SUPERIOR MESENTERIC ARTERY THROMBOSIS (HCC): Status: ACTIVE | Noted: 2024-01-01

## 2024-04-10 PROBLEM — R19.7 NAUSEA VOMITING AND DIARRHEA: Status: ACTIVE | Noted: 2024-01-01

## 2024-04-10 PROBLEM — R11.2 NAUSEA VOMITING AND DIARRHEA: Status: ACTIVE | Noted: 2024-01-01

## 2024-04-10 NOTE — CONSULTS
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 Dickinson and Dr. Calvo    Critical care time: 42 minutes    Mirza Camacho MD  Pulmonary & Critical Care Medicine  [facility]      History of Present Illness:   Ms. Sprague is a 89 year old female never smoker with history of HTN and hyperlipidemia who is admitted with hypertensive crisis and acute aortic syndrome. She presented tot he ED with abdominal pain and nausea that started yesterday. 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 She was started on esmolol and nitroglycerine and vascular surgery was consulted. She is primarily Portuguese speaking and history was obtained with assistance from her children at bedside. She is reporting abdominal pain currently.    12 point ROS negative except per HPI.    Past Medical History:    Arthritis    History of stomach ulcers       Past Surgical History:   Procedure Laterality Date    Removal gallbladder      Total abdom hysterectomy         (Not in a hospital admission)    No outpatient medications have been marked as taking for the 4/10/24 encounter (Hospital Encounter).      No Known Allergies   Social History     Socioeconomic History    Marital status:    Tobacco Use    Smoking status: Never    Smokeless tobacco: Never   Vaping Use    Vaping status: Never Used   Substance  and Sexual Activity    Alcohol use: Never    Drug use: Never     Social Determinants of Health     Financial Resource Strain: Low Risk  (1/25/2023)    Received from [facility], [facility]    Overall Financial Resource Strain (CARDIA)     Difficulty of Paying Living Expenses: Not hard at all   Food Insecurity: No Food Insecurity (1/25/2023)    Received from [facility], [facility]    Hunger Vital Sign     Worried About Running Out of Food in the Last Year: Never true     Ran Out of Food in the Last Year: Never true   Transportation Needs: No Transportation Needs (1/25/2023)    Received from [facility], [facility]    PRAPARE - Transportation     Lack of Transportation (Medical): No     Lack of Transportation (Non-Medical): No   Physical Activity: Inactive (1/25/2023)    Received from [facility], [facility]    Exercise Vital Sign     Days of Exercise per Week: 0 days     Minutes of Exercise per Session: 0 min   Stress: No Stress Concern Present (1/25/2023)    Received from [facility], [facility]    [employer] - Occupational Stress Questionnaire     Feeling of Stress : Only a little   Social Connections: Socially Isolated (1/25/2023)    Received from [facility], [facility]    Social Connection and Isolation Panel [NHANES]     Frequency of Communication with Friends and Family: More than three times a week     Frequency of Social Gatherings with Friends and Family: Once a week     Attends Sabianist Services: Never     Active Member of Clubs or Organizations: No     Attends Club or Organization Meetings: Never     Marital Status:    Housing Stability: Low Risk  (1/25/2023)    Received from [facility],  [facility]    Housing Stability Vital Sign     Unable to Pay for Housing in the Last Year: No     Number of Places Lived in the Last Year: 1     In the last 12 months, was there a time when you did not have a steady place to sleep or slept in a shelter (including now)?: No       No family history on file.   Family history negative for lung disease except as noted above     Exam:  Vitals:    04/10/24 1147 04/10/24 1230 04/10/24 1530   BP: (!) 191/101 (!) 193/91 (!) 187/97   Pulse: 78 77 91   Resp: 18 16    Temp: (!) 96 °F (35.6 °C)     TempSrc: Temporal     SpO2: 98% 94% 97%   Weight: 140 lb (63.5 kg)     Height: 5' (1.524 m)       General: laying in bed, moaning in pain  Skin: no rash, ulcers or subcutaneous nodules  Eyes: anicteric sclerae, moist conjunctivae  Head, ears, nose, throat: atraumatic, oropharynx clear with moist mucous membranes  Neck: trachea midline with no thyromegaly  Heart: regular rate and rhythm, no murmurs / rubs / gallops  Lungs: clear bilaterally, normal respiratory effort  Abdomen: soft, non-distended, diffusely tender to palpation without rebound or guarding  Extremities: no edema or cyanosis  Neuro: awake and alert    Labs:  Reviewed in EMR    Inpatient Medications:  Reviewed in EMR    Imaging:   Chest imaging reviewed

## 2024-04-10 NOTE — H&P
[facility] Hospitalist H&P     CC:   Chief Complaint   Patient presents with    Abdominal Pain     PCP: KARRI WISE JR    Date of Admission: 4/10/2024 12:10 PM    ASSESSMENT / PLAN:     Ms. Sprague is an 89 yo F with PMH of HTN, HL who presented with abdominal pain, found to have aortic mural hematoma occluding SMA and celiac artery.     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     Further recommendations pending patient's clinical course.  [facility] hospitalist to continue to follow patient while in house    Patient and/or patient's family given opportunity to ask questions and note understanding and agreeing with therapeutic plan as outlined    Thelma Mercado MD  [facility] Hospitalist  Answering Service number: [phone]    HPI       History of Present Illness:     Ms. Sprague is an 89 yo F with PMH of HTN, HL who presented with abdominal pain. Patient states symptoms started about 2 days ago, +nausea, +diarrhea. Familiy at bedside.    In the ED, BP elevated, labs with mildly elevated LFTs and troponin. CT A/P with mural hematoma with thrombosis of SMA and celiac artery origin.       PMH  Past Medical History:    Arthritis    History of stomach ulcers        PSH  Past Surgical History:   Procedure Laterality Date    Removal gallbladder      Total abdom hysterectomy          ALL:  No Known Allergies     Home Medications:  No outpatient  medications have been marked as taking for the 4/10/24 encounter (Hospital Encounter).         Soc Hx  Social History     Tobacco Use    Smoking status: Never    Smokeless tobacco: Never   Substance Use Topics    Alcohol use: Never        Fam Hx  No family history on file.    Review of Systems  Comprehensive ROS reviewed and negative except for what's stated above.     OBJECTIVE:  BP (!) 187/97   Pulse 91   Temp (!) 96 °F (35.6 °C) (Temporal)   Resp 16   Ht 5' (1.524 m)   Wt 140 lb (63.5 kg)   SpO2 97%   BMI 27.34 kg/m²     GEN: elderly female, uncomfortable  HEENT: EOMI  Pulm: CTAB, no crackles or wheezes  CV: RRR, no murmurs  ABD: Soft, non-tender, non-distended, +BS  SKIN: warm, dry  EXT: no edema    Diagnostic Data:    CBC/Chem    Recent Labs   Lab 04/10/24  1220   RBC 5.22   HGB 14.4   HCT 45.4   MCV 87.0   MCH 27.6   MCHC 31.7   RDW 13.7   NEPRELIM 7.14   WBC 8.3   .0         Recent Labs   Lab 04/10/24  1220 04/10/24  1300   *  --    BUN  --  21  21   CREATSERUM 0.75  --    EGFRCR 77  --    CA 9.3  --      --    K  --  3.6  3.6     --    CO2 20.0*  --      Lab Results   Component Value Date    WBC 8.3 04/10/2024    HGB 14.4 04/10/2024    HCT 45.4 04/10/2024    .0 04/10/2024    CREATSERUM 0.75 04/10/2024    BUN 21 04/10/2024    BUN 21 04/10/2024     04/10/2024    K 3.6 04/10/2024    K 3.6 04/10/2024     04/10/2024    CO2 20.0 04/10/2024     04/10/2024    CA 9.3 04/10/2024    ALB 4.5 04/10/2024    ALKPHO 62 04/10/2024    BILT 1.7 04/10/2024    TP 7.3 04/10/2024    AST 96 04/10/2024    ALT 63 04/10/2024    LIP 56 04/10/2024       No results for input(s): \"TROP\" in the last 168 hours.    Additional Diagnostics: ECG:    Radiology: CT ABDOMEN+PELVIS(CONTRAST ONLY)(CPT=74177)    Result Date: 4/10/2024  CONCLUSION:  1. 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  Interventional  radiology consultation recommended. Called to . 2. Interstitial abnormality likely pulmonary interstitial edema.     Dictated by (CST): Ole Chavarria MD on 4/10/2024 at 3:20 PM     Finalized by (CST): Ole Chavarria MD on 4/10/2024 at 3:32 PM

## 2024-04-10 NOTE — ANESTHESIA PREPROCEDURE EVALUATION
Anesthesia PreOp Note    HPI:     Karen Sprague is a 89 year old female who presents for preoperative consultation requested by: * Surgery not found *    Date of Surgery:       Indication: * No surgery found *    Relevant Problems   No relevant active problems       NPO:                         History Review:  Patient Active Problem List    Diagnosis Date Noted    Intramural aortic hematoma (HCC) 04/10/2024       Past Medical History:    Arthritis    History of stomach ulcers       Past Surgical History:   Procedure Laterality Date    Removal gallbladder      Total abdom hysterectomy         (Not in a hospital admission)    Current Facility-Administered Medications Ordered in Epic   Medication Dose Route Frequency Provider Last Rate Last Admin    esmolol (Brevibloc) 2000 mg/100mL infusion premix   mcg/kg/min (Ideal) Intravenous Continuous Kiran Dickinson MD        nitroGLYCERIN in dextrose 5% 50 mg/250mL infusion premix  5-400 mcg/min Intravenous Continuous Kiran Dickinson MD 12 mL/hr at 04/10/24 1614 40 mcg/min at 04/10/24 1614    heparin (Porcine) 1000 UNIT/ML injection - BOLUS IV 5,100 Units  80 Units/kg Intravenous Once Kiran Dickinson MD        piperacillin-tazobactam (Zosyn) 4.5 g in dextrose 5% 100 mL IVPB-ADDV  4.5 g Intravenous Once Kiran Dickinson MD         Current Outpatient Medications Ordered in Epic   Medication Sig Dispense Refill    diclofenac (VOLTAREN) 1 % External Gel Apply 2 g topically 4 (four) times daily. 50 g 0       No Known Allergies    No family history on file.  Social History     Socioeconomic History    Marital status:    Tobacco Use    Smoking status: Never    Smokeless tobacco: Never   Vaping Use    Vaping status: Never Used   Substance and Sexual Activity    Alcohol use: Never    Drug use: Never       Available pre-op labs reviewed.  Lab Results   Component Value Date    WBC 8.3 04/10/2024    RBC 5.22 04/10/2024    HGB 14.4 04/10/2024    HCT 45.4 04/10/2024    MCV  87.0 04/10/2024    MCH 27.6 04/10/2024    MCHC 31.7 04/10/2024    RDW 13.7 04/10/2024    .0 04/10/2024     Lab Results   Component Value Date     04/10/2024    K 3.6 04/10/2024    K 3.6 04/10/2024     04/10/2024    CO2 20.0 (L) 04/10/2024    BUN 21 04/10/2024    BUN 21 04/10/2024    CREATSERUM 0.75 04/10/2024     (H) 04/10/2024    CA 9.3 04/10/2024          Vital Signs:  Body mass index is 27.34 kg/m².   height is 1.524 m (5') and weight is 63.5 kg (140 lb). Her temperature is 96.8 °F (36 °C). Her blood pressure is 182/93 (abnormal) and her pulse is 92. Her respiration is 20 and oxygen saturation is 95%.   Vitals:    04/10/24 1147 04/10/24 1230 04/10/24 1530 04/10/24 1615   BP: (!) 191/101 (!) 193/91 (!) 187/97 (!) 182/93   Pulse: 78 77 91 92   Resp: 18 16  20   Temp: (!) 96 °F (35.6 °C)   96.8 °F (36 °C)   TempSrc: Temporal      SpO2: 98% 94% 97% 95%   Weight: 63.5 kg (140 lb)      Height: 1.524 m (5')           Anesthesia Evaluation     Patient summary reviewed and Nursing notes reviewed    Airway   Mallampati: III  Dental - Dentition appears grossly intact     Pulmonary     breath sounds clear to auscultation  (+) shortness of breath  Cardiovascular   (+) dysrhythmias    Rhythm: regular  Rate: normal  ROS comment: Celiac,superior mesenteric occlusion    Neuro/Psych      Comments: Dementia moderate    GI/Hepatic/Renal    (+) PUD    Endo/Other    Abdominal                  Anesthesia Plan:   ASA:  4  Plan:   MAC  Post-op Pain Management: IV analgesics  Plan Comments: Report received from , Plan Iv sedation  Informed Consent Plan and Risks Discussed With:  Child/children, significant other and spouse      I have informed Karen Sprague and/or legal guardian or family member of the nature of the anesthetic plan, benefits, risks including possible dental damage if relevant, major complications, and any alternative forms of anesthetic management.   All of the patient's questions were  answered to the best of my ability. The patient desires the anesthetic management as planned.  JENNIFER ESPINOZA MD  4/10/2024 4:40 PM  Present on Admission:  **None**

## 2024-04-10 NOTE — ED QUICK NOTES
Pt continuing to scream and cry with pain  Pts family at bedside agree to morphine for pain  Dr brito placed order

## 2024-04-10 NOTE — ED QUICK NOTES
OR at bedside  Vascular surgeon, hospitalist and ER doc have spoken with family and patient regarding situation and procedure

## 2024-04-10 NOTE — H&P
Radha Rodriguez MD.  [facility]   Vascular and Endovascular Surgery     VASCULAR SURGERY   CONSULT NOTE      Name: Karen Sprague   :   4/10/1935  [id number]     4/10/2024       REFERRING PHYSICIAN: Yair Kelly MD  PRIMARY CARE PHYSICIAN:  KARRI WISE JR    HISTORY OF PRESENT ILLNESS: Karen is an 89 year old female whom I have been asked to see regarding severe abdominal pain. Patient speaks only [place]. History is given by her family who is at the bedside. Her pain started last night and worsened this morning. She has had nausea, vomiting and has had a few loose stools as well. She does not have a history of arrhythmia but family says she was having some palpitations. She takes a daily aspirin. She does not smoke. Family reports some mild dementia, but patient lives alone and can take care of all her ADLs.     PAST MEDICAL HISTORY:    Past Medical History:    Arthritis    History of stomach ulcers     PAST SURGICAL HISTORY:   Past Surgical History:   Procedure Laterality Date    Removal gallbladder      Total abdom hysterectomy       MEDICATIONS:     Current Facility-Administered Medications:     esmolol (Brevibloc) 2000 mg/100mL infusion premix,  mcg/kg/min (Ideal), Intravenous, Continuous    nitroGLYCERIN in dextrose 5% 50 mg/250mL infusion premix, 5-400 mcg/min, Intravenous, Continuous    morphINE PF 4 MG/ML injection 4 mg, 4 mg, Intravenous, Once    heparin (Porcine) 1000 UNIT/ML injection - BOLUS IV 5,100 Units, 80 Units/kg, Intravenous, Once    ALLERGIES:    She has No Known Allergies.    SOCIAL HISTORY:    Patient  reports that she has never smoked. She has never used smokeless tobacco. She reports that she does not drink alcohol and does not use drugs.    FAMILY HISTORY:    Patient's family history is not on file.    ROS:    Comprehensive ROS reviewed and negative except for what's stated above.  Including negative for chest pain, shortness of breath, syncope.      EXAM:  BP (!) 187/97   Pulse 91   Temp (!) 96 °F (35.6 °C) (Temporal)   Resp 16   Ht 5' (1.524 m)   Wt 140 lb (63.5 kg)   SpO2 97%   BMI 27.34 kg/m²   GENERAL: alert and oriented x3, in no apparent distress  PSYCH: Normal mood and affect  NEURO: Cranial nerves grossly intact. No sensory or motor deficits  HEENT: Ears and throat are clear  NECK: Supple  RESPIRATORY: Normal work of breathing  CARDIO: RRR   ABDOMEN: Soft, non-tender, non-distended  BACK: Normal, no tenderness  SKIN: No rashes, warm and dry  MUSCULOSKELETAL: Strength 5/5 throughout, sensation intact  EXTREMITIES: No edema  VASCULAR: Femoral pulses are palpable.        Diagnostic Data:      LABS:  Recent Labs   Lab 04/10/24  1220   WBC 8.3   HGB 14.4   MCV 87.0   .0       Recent Labs   Lab 04/10/24  1220 04/10/24  1300     --    K  --  3.6  3.6     --    CO2 20.0*  --    BUN  --  21  21   CREATSERUM 0.75  --    *  --    CA 9.3  --      No results for input(s): \"PTP\", \"INR\", \"PTT\" in the last 168 hours.  Recent Labs   Lab 04/10/24  1220 04/10/24  1300   ALT 54* 63*   AST  --  96*   ALB 4.5  --      No results for input(s): \"TROP\" in the last 168 hours.  No results found for: \"ANAS\", \"MELLO\", \"ANASCRN\"  No results for input(s): \"PCACT\", \"PSACT\", \"AT3ACT\", \"HIPAB\", \"PATHI\", \"STALA\", \"DRVVTRATIO\", \"DRVVT\", \"STACLOT\", \"CARIGG\", \"O7WA3ZZILZ\", \"J5WV7KIHVT\", \"RA\", \"HAVIGM\", \"HBCIGM\", \"HCVAB\", \"HBSAG\", \"HBCAB\", \"HBVDNAINTERP\", \"ANAS\", \"C3\", \"C4\" in the last 168 hours.    Radiology: CT ABDOMEN+PELVIS(CONTRAST ONLY)(CPT=74177)    Result Date: 4/10/2024  PROCEDURE: CT ABDOMEN + PELVIS (CONTRAST ONLY) (CPT=74177)  COMPARISON: None.  INDICATIONS: Diffuse abdominal pain with nausea.  TECHNIQUE: CT images of the abdomen and pelvis were obtained with non-ionic intravenous contrast material.  Automated exposure control for dose reduction was used. Adjustment of the mA and/or kV was done based on the patient's size. Use of iterative  reconstruction technique for dose reduction was used.  Dose information is transmitted to the ACR (American College of Radiology) NRDR (National Radiology Data Registry) which includes the Dose Index Registry.  FINDINGS:  LIVER: Hepatic steatosis. BILIARY: Post cholecystectomy.  SPLEEN: Normal.  PANCREAS: Normal.  ADRENALS: Normal. KIDNEYS: A 1.6 cm left upper pole renal cyst and few additional bilateral renal lesions which probably represent smaller cysts requiring no additional imaging evaluation.  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  Coronary artery calcification.  Retroaortic left renal vein. LYMPHADENOPATHY: None. GI/MESENTERY: No pneumatosis or portal venous gas.  Liquid type stool in the right-side of the colon.  Appendix is not identified.  No pneumatosis, portal venous gas or mesenteric gas.  Colonic diverticulosis.  No obstruction, bowel wall thickening, or mesenteric mass. ABDOMINAL WALL: No mass or hernia.  URINARY BLADDER: Normal. ASCITES:   None. PELVIC ORGANS: No suspect pelvic mass.  Post hysterectomy. BONES: No suspect bone lesion.  Grade 1 degenerative spondylolisthesis of L5 on S1. LUNG BASES: Reticular interstitial prominence with septal line thickening suggesting interstitial edema.  Atelectasis and or scar in the lingula and middle lobe. OTHER: Left atrial enlargement.         CONCLUSION:  1. 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  Interventional radiology consultation recommended. Called to . 2. Interstitial abnormality likely pulmonary interstitial edema.     Dictated by (CST): Ole Chavarria MD on 4/10/2024 at 3:20 PM     Finalized by (CST): Ole Chavarria MD on 4/10/2024 at 3:32 PM          US VENOUS DOPPLER LEG LEFT - DIAG IMG (CPT=93971)    Result Date: 3/19/2024  PROCEDURE: US VENOUS DOPPLER LEG LEFT-DIAG IMG (CPT=93971)   COMPARISON: None.  INDICATIONS: Left thigh and knee pain after physical therapy today  TECHNIQUE: Color duplex Doppler venous ultrasound of the left lower extremity was performed in the usual manner.  FINDINGS: The femoral and popliteal veins appear normal.  Normal flow was demonstrated with color and pulsed Doppler.  Visualized portions of the great and small saphenous, posterior tibial, and peroneal veins appear normal.   THROMBI: None visible. COMPRESSIBILITY: Normal. OTHER: There is a complex 5.5 x 2.2 x 2.9 cm popliteal/Baker's cyst.         CONCLUSION:  1. No evidence of left lower extremity deep venous thrombosis. 2. Complex 5.5 cm popliteal/Baker's cyst.   A preliminary report was issued by the [facility] teleradiology service. There are no major discrepancies.  elm-remote  Dictated by (CST): Ron Wright MD on 3/19/2024 at 6:26 AM     Finalized by (CST): Ron Wright MD on 3/19/2024 at 6:27 AM          XR KNEE (1 OR 2 VIEWS), LEFT (CPT=73560)    Result Date: 3/18/2024  PROCEDURE: XR KNEE (1 OR 2 VIEWS), LEFT (CPT=73560)  COMPARISON: None.  INDICATIONS: Generalized left knee pain. No trauma or injury.  TECHNIQUE: To views were obtained.   FINDINGS:  BONES: No acute fracture.  No dislocation.  Tricompartmental osteoarthrosis, which is most advanced and mild-to-moderate in the patellofemoral compartment. SOFT TISSUES: There is soft tissue swelling about the knee.  There are vascular calcifications. EFFUSION: Large suprapatellar joint effusion. OTHER: Negative.         CONCLUSION:   No acute fracture or dislocation.  Tricompartmental osteoarthrosis, which is most advanced and mild-to-moderate involving the patellofemoral compartment.  Large suprapatellar joint effusion with soft tissue swelling about the knee.     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 Will plan for mesenteric angiogram with percutaneous thrombectomy of the celiac and SMA. Discussed with family that if we are unable to get this open in an endovascular fashion will plan for operative laparotomy. Discussed risks and benefits and they elect to proceed.     The patient indicated an understanding of these issues and agreed to the plan and all questions were answered.     Thank you for allowing me to participate in the patient's care.     Sincerely,  Radha Rodriguez MD  04/10/24   4:02 PM

## 2024-04-10 NOTE — ED PROVIDER NOTES
Signout taken with disposition pending CT results -same as noted below in setting of hypertensive state without neurovascular compromise.  Vascular surgery and IR consulted, vascular surgery expeditiously to emergency department with plan for angiographic evaluation and in agreement vasoactive therapy and requesting heparin bolus.  Given concern for possible impending ischemia, will initiate Zosyn; hospitalist coverage/on call medicine Dr. Mercado evaluating/admitting with intensivist coverage Dr. Camacho evaluating in ED and IR NIRAV Helms to ED. Patient/family updated regarding findings/plan of care in patient with NVI BLE.      CT ABDOMEN+PELVIS(CONTRAST ONLY)(CPT=74177)    Result Date: 4/10/2024  PROCEDURE: CT ABDOMEN + PELVIS (CONTRAST ONLY) (CPT=74177)  COMPARISON: None.  INDICATIONS: Diffuse abdominal pain with nausea.  TECHNIQUE: CT images of the abdomen and pelvis were obtained with non-ionic intravenous contrast material.  Automated exposure control for dose reduction was used. Adjustment of the mA and/or kV was done based on the patient's size. Use of iterative reconstruction technique for dose reduction was used.  Dose information is transmitted to the ACR (American College of Radiology) NRDR (National Radiology Data Registry) which includes the Dose Index Registry.  FINDINGS:  LIVER: Hepatic steatosis. BILIARY: Post cholecystectomy.  SPLEEN: Normal.  PANCREAS: Normal.  ADRENALS: Normal. KIDNEYS: A 1.6 cm left upper pole renal cyst and few additional bilateral renal lesions which probably represent smaller cysts requiring no additional imaging evaluation.  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  Coronary artery calcification.  Retroaortic left renal vein. LYMPHADENOPATHY: None. GI/MESENTERY: No pneumatosis or portal venous gas.  Liquid type stool in the right-side of the colon.  Appendix is not identified.  No pneumatosis,  portal venous gas or mesenteric gas.  Colonic diverticulosis.  No obstruction, bowel wall thickening, or mesenteric mass. ABDOMINAL WALL: No mass or hernia.  URINARY BLADDER: Normal. ASCITES:   None. PELVIC ORGANS: No suspect pelvic mass.  Post hysterectomy. BONES: No suspect bone lesion.  Grade 1 degenerative spondylolisthesis of L5 on S1. LUNG BASES: Reticular interstitial prominence with septal line thickening suggesting interstitial edema.  Atelectasis and or scar in the lingula and middle lobe. OTHER: Left atrial enlargement.         CONCLUSION:  1. 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  Interventional radiology consultation recommended. Called to . 2. Interstitial abnormality likely pulmonary interstitial edema.     Dictated by (CST): Ole Chavarria MD on 4/10/2024 at 3:20 PM     Finalized by (CST): Ole Chavarria MD on 4/10/2024 at 3:32 PM          ED Course as of 04/10/24 1612  ------------------------------------------------------------  Time: 04/10 1549  Comment: CT findings noted, vascular/IR/intensivist coverage Marleen Rodriguez/Jacob/Doug consulted with esmolol/nitroglycerin to be initiated in addition to T&S/analgesia.  ------------------------------------------------------------  Time: 04/10 1600  Comment: Family updated regarding CT findings and vascular surgery/IR evaluations; vascular Dr. Rodriguez graciously/expeditiously in ED to evaluate and take for angiographic evaluation, Marleen Mercado/Doug in ED evaluating and updated. Vascular in agreement with vasoactive therapy for BP/HR control and requesting heparin bolus.  ------------------------------------------------------------  Time: 04/10 7029  Comment: BLE with intact DP/PT pulses, family updated regarding POC.       A total of 33 minutes of critical care time (exclusive of billable procedures) was administered to manage the patient's critical imaging  findings and cardiovascular instability due to her abdominal aortic hematoma with celiac/SMA obstruction in setting of hypertension.  This involved direct patient intervention, complex decision making, and/or extensive discussions with the patient, family, and clinical staff.

## 2024-04-10 NOTE — ED QUICK NOTES
Pt yelling, anxious, holding abdomen  Pt had 2 episodes of diarrhea prior to getting on cart, one episode of liquid diarrhea once on the cart  Pt was cleaned thoroughly with towels and wipes, bedding changed, gown changed  Diaper removed, new bedding, pads and gown placed    Pt states she needs to use the bathroom again  Walked into bathroom with another episode of diarrhea  Sitting on the toilet with family at bedside

## 2024-04-10 NOTE — ED PROVIDER NOTES
Patient Seen in: [facility] Emergency Department      History     Chief Complaint   Patient presents with    Abdominal Pain     Stated Complaint: Abdominal Pain, Nausea    Subjective:   HPI    Patient is an 89-year-old female speaks Indonesian.  She was brought to the ER by her daughter and daughter-in-law who help with the history.  She reportedly had intermittent abdominal pains during the night this morning she had nausea and she vomited.  She has had a couple loose bowel movements.  No fever  No recent antibiotic    Objective:   Past Medical History:    Arthritis    History of stomach ulcers              Past Surgical History:   Procedure Laterality Date    Removal gallbladder      Total abdom hysterectomy                  Social History     Socioeconomic History    Marital status:    Tobacco Use    Smoking status: Never    Smokeless tobacco: Never   Vaping Use    Vaping status: Never Used   Substance and Sexual Activity    Alcohol use: Never    Drug use: Never     Social Determinants of Health     Financial Resource Strain: Low Risk  (1/25/2023)    Received from [facility], [facility]    Overall Financial Resource Strain (CARDIA)     Difficulty of Paying Living Expenses: Not hard at all   Food Insecurity: No Food Insecurity (4/10/2024)    Food Insecurity     Food Insecurity: Never true   Transportation Needs: No Transportation Needs (4/10/2024)    Transportation Needs     Lack of Transportation: No   Physical Activity: Inactive (1/25/2023)    Received from [facility], [facility]    Exercise Vital Sign     Days of Exercise per Week: 0 days     Minutes of Exercise per Session: 0 min   Stress: No Stress Concern Present (1/25/2023)    Received from [facility], [facility]    [place] [employer] - Occupational Stress Questionnaire     Feeling of Stress :  Only a little   Social Connections: Socially Isolated (1/25/2023)    Received from [facility], [facility]    Social Connection and Isolation Panel [NHANES]     Frequency of Communication with Friends and Family: More than three times a week     Frequency of Social Gatherings with Friends and Family: Once a week     Attends Spiritism Services: Never     Active Member of Clubs or Organizations: No     Attends Club or Organization Meetings: Never     Marital Status:    Housing Stability: Low Risk  (4/10/2024)    Housing Stability     Housing Instability: No              Review of Systems    Positive for stated complaint: Abdominal Pain, Nausea  Other systems are as noted in HPI.  Constitutional and vital signs reviewed.      All other systems reviewed and negative except as noted above.    Physical Exam     ED Triage Vitals [04/10/24 1147]   BP (!) 191/101   Pulse 78   Resp 18   Temp (!) 96 °F (35.6 °C)   Temp src Temporal   SpO2 98 %   O2 Device None (Room air)       Current:/56 (BP Location: Left arm)   Pulse 72   Temp 97 °F (36.1 °C) (Temporal)   Resp (!) 30   Ht 152.4 cm (5')   Wt 65.5 kg   SpO2 94%   BMI 28.20 kg/m²         Physical Exam    Constitutional: Oriented to person, place, and time. Appears well-developed and well-nourished.   HEENT:   Head: Normocephalic and atraumatic.   Right Ear: External ear normal.   Left Ear: External ear normal.   Nose: Nose normal.   Mouth/Throat: Oropharynx is clear and moist.   Eyes: Conjunctivae and EOM are normal. Pupils are equal, round, and reactive to light.   Neck: Neck supple.   Cardiovascular: Normal rate, regular rhythm, normal heart sounds and intact distal pulses.    Pulmonary/Chest: Effort normal and breath sounds normal. No respiratory distress.   Abdominal: Soft. Bowel sounds are normal. Exhibits no distension and no mass. There diffuse  tenderness. There is no rebound and no guarding.    Musculoskeletal: Normal range of motion. Exhibits no edema or tenderness.   Lymphadenopathy: No cervical adenopathy.   Neurological: Alert and oriented to person, place, and time. Normal reflexes. No cranial nerve deficit. No motor os sensory defecits noted Coordination normal.   Skin: Skin is warm and dry.   Psychiatric: Normal mood and affect. Behavior is normal. Judgment and thought content normal.   Nursing note and vitals reviewed.        ED Course     Labs Reviewed   BASIC METABOLIC PANEL (8) - Abnormal; Notable for the following components:       Result Value    Glucose 154 (*)     CO2 20.0 (*)     All other components within normal limits   HEPATIC FUNCTION PANEL (7) - Abnormal; Notable for the following components:    ALT 54 (*)     Bilirubin, Total 1.7 (*)     Bilirubin, Direct 0.5 (*)     All other components within normal limits   REDRAW HFPA (P) - Abnormal; Notable for the following components:    AST 96 (*)     Bilirubin, Direct 0.6 (*)     ALT 63 (*)     All other components within normal limits   LACTIC ACID, PLASMA - Abnormal; Notable for the following components:    Lactic Acid 8.5 (*)     All other components within normal limits   TROPONIN I HIGH SENSITIVITY - Abnormal; Notable for the following components:    Troponin I (High Sensitivity) 42 (*)     All other components within normal limits   LIPID PANEL - Abnormal; Notable for the following components:    HDL Cholesterol 64 (*)     All other components within normal limits   LACTIC ACID REFLEX POST POSTIVE - Abnormal; Notable for the following components:    Lactic Acid 6.1 (*)     All other components within normal limits   TROPONIN I HIGH SENSITIVITY - Abnormal; Notable for the following components:    Troponin I (High Sensitivity) 435 (*)     All other components within normal limits   BASIC METABOLIC PANEL (8) - Abnormal; Notable for the following components:    CO2 19.0 (*)     BUN/CREA Ratio 34.4 (*)     Calcium, Total 8.6 (*)      Calculated Osmolality 297 (*)     All other components within normal limits   CBC, PLATELET; NO DIFFERENTIAL - Abnormal; Notable for the following components:    WBC 17.7 (*)     .0 (*)     Immature Platelet Fraction 9.6 (*)     All other components within normal limits   PTT, ACTIVATED - Abnormal; Notable for the following components:    .6 (*)     All other components within normal limits   TROPONIN I HIGH SENSITIVITY - Abnormal; Notable for the following components:    Troponin I (High Sensitivity) 438 (*)     All other components within normal limits   COMP METABOLIC PANEL (14) - Abnormal; Notable for the following components:    Glucose 138 (*)     CO2 14.0 (*)     BUN/CREA Ratio 30.1 (*)     Calcium, Total 8.3 (*)     Calculated Osmolality 300 (*)     ALT 1,633 (*)     AST 3,079 (*)     Bilirubin, Total 2.5 (*)     Globulin  2.2 (*)     All other components within normal limits   CBC, PLATELET; NO DIFFERENTIAL - Abnormal; Notable for the following components:    WBC 13.7 (*)     MCHC 30.4 (*)     .0 (*)     Immature Platelet Fraction 10.4 (*)     All other components within normal limits   PROTHROMBIN TIME (PT) - Abnormal; Notable for the following components:    PT 21.8 (*)     INR 1.78 (*)     All other components within normal limits   LACTIC ACID REFLEX POST POSITIVE [id number] - Abnormal; Notable for the following components:    Lactic Acid 4.6 (*)     All other components within normal limits   PTT, ACTIVATED - Abnormal; Notable for the following components:    .1 (*)     All other components within normal limits   TROPONIN I HIGH SENSITIVITY - Abnormal; Notable for the following components:    Troponin I (High Sensitivity) 422 (*)     All other components within normal limits   PTT, ACTIVATED - Abnormal; Notable for the following components:    PTT >240.0 (*)     All other components within normal limits   PTT, ACTIVATED - Abnormal; Notable for the following components:    PTT  166.4 (*)     All other components within normal limits   CBC, PLATELET; NO DIFFERENTIAL - Abnormal; Notable for the following components:    .0 (*)     All other components within normal limits   COMP METABOLIC PANEL (14) - Abnormal; Notable for the following components:    Glucose 107 (*)     Chloride 113 (*)     CO2 18.0 (*)     BUN 32 (*)     BUN/CREA Ratio 41.0 (*)     Calcium, Total 8.0 (*)     Calculated Osmolality 297 (*)     ALT 1,389 (*)     AST 1,652 (*)     Bilirubin, Total 2.0 (*)     Total Protein 5.2 (*)     Globulin  2.0 (*)     All other components within normal limits   PTT, ACTIVATED - Abnormal; Notable for the following components:    PTT 92.2 (*)     All other components within normal limits   CBC W/ DIFFERENTIAL - Abnormal; Notable for the following components:    Lymphocyte Absolute 0.99 (*)     All other components within normal limits   LIPASE - Normal   REDRAW BMP - Normal   PTT, ACTIVATED - Normal   MAGNESIUM - Normal   C. DIFFICILE(TOXIGENIC)PCR - Normal   OCCULT BLOOD, STOOL - Normal   SHIGATOXIN - Normal   RAPID SARS-COV-2 BY PCR - Normal   MRSA SCREEN BY PCR - Normal   CBC WITH DIFFERENTIAL WITH PLATELET    Narrative:     The following orders were created for panel order CBC With Differential With Platelet.  Procedure                               Abnormality         Status                     ---------                               -----------         ------                     CBC W/ DIFFERENTIAL[[id number]]          Abnormal            Final result                 Please view results for these tests on the individual orders.   TYPE AND SCREEN    Narrative:     The following orders were created for panel order Type and screen.  Procedure                               Abnormality         Status                     ---------                               -----------         ------                     ABORH (Blood Type)[[id number]]                               Final result                Antibody Screen[[id number]]                                  Final result                 Please view results for these tests on the individual orders.   ABORH (BLOOD TYPE)   ANTIBODY SCREEN   ABORH CONFIRMATION   RAINBOW DRAW LAVENDER   RAINBOW DRAW LIGHT GREEN   RAINBOW DRAW BLUE   RAINBOW DRAW GOLD   STOOL CULTURE W/SHIGATOXIN    Narrative:     The following orders were created for panel order Stool Culture W/Shigatoxin.  Procedure                               Abnormality         Status                     ---------                               -----------         ------                     Stool Culture[[id number]]                                    In process                 Shigatoxin[[id number]]                   Normal              Final result                 Please view results for these tests on the individual orders.   STOOL CULTURE(P)          ED Course as of 04/12/24 0945  ------------------------------------------------------------  Time: 04/10 1549  Comment: CT findings noted, vascular/IR/intensivist coverage Marleen Rodriguez/Jacob/Doug consulted with esmolol/nitroglycerin to be initiated in addition to T&S/analgesia.  ------------------------------------------------------------  Time: 04/10 1600  Comment: Family updated regarding CT findings and vascular surgery/IR evaluations; vascular Dr. Rodriguez graciously/expeditiously in ED to evaluate and take for angiographic evaluation, Marleen Mercado/Doug in ED evaluating and updated. Vascular in agreement with vasoactive therapy for BP/HR control and requesting heparin bolus.  ------------------------------------------------------------  Time: 04/10 1605  Comment: BLE with intact DP/PT pulses, family updated regarding POC.              MDM      Use of independent historian: Patient's daughter and daughter-in-law provide history I also translate    I personally reviewed and interpreted the images :     CTA ABD/PEL (CPT=74174)    Result Date:  4/11/2024  CONCLUSION:  1. Interval placement of celiac and superior mesenteric artery origin stents.  Eccentric mural thrombus in the suprarenal abdominal aorta extending into the lumen of the stents has partially improved from the prior CT and now results in less than 50% in-stent stenosis. 2. 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  However, the splenic artery is patent distally, probably related to collateral supply from the remainder of the celiac axis. 3. Small bowel is mildly prominent compared to the prior CT with development of intraluminal fluid.  There has also been development of edema at the root of the small bowel mesentery that extends to the pancreaticoduodenal groove and peripancreatic region.  While nonspecific, the possibility of recent or developing small bowel ischemia cannot be excluded.  Correlate clinically with symptoms and physical examination.  No pneumatosis or portal venous gas.  Acute pancreatitis, duodenitis or peptic ulcer disease are considered less likely given the patient's history. 4. Residual contrast in both renal cortices from the previous CT consistent with underlying intrinsic renal dysfunction.  Correlate clinically. 5. Development of small bilateral pleural effusions in this patient with cardiomegaly/coronary atherosclerosis, probably related to mild CHF/fluid overload.  There is associated passive atelectasis at both lung bases.  However, pulmonary edema seen on the prior CT has otherwise resolved. 6. Stable stenosis at the origin of the JUJU due to mixed calcified/soft plaque formation. 7. Developmental variant accessory right renal artery. 8. Status post cholecystectomy.  No biliary ductal dilatation. 9. Status post hysterectomy. 10. Lesser incidental findings as above.  Results of this examination were discussed with the patient's physician, Dr. Sly Gonzalez, by Dr. Odom at 16:40 on 04/11/2024.    Elm-remote  Dictated by (CST): Bonifacio Odom MD on 4/11/2024 at 4:12 PM     Finalized by (CST): Bonifacio Odom MD on 4/11/2024 at 4:44 PM           Vitals:    04/12/24 0600 04/12/24 0700 04/12/24 0800 04/12/24 0900   BP: 141/53 135/52 138/53 135/56   BP Location: Left arm Left arm Left arm Left arm   Pulse: 70 73 72 72   Resp: 25 (!) 28 26 (!) 30   Temp:       TempSrc:       SpO2: 95% 94% 94% 94%   Weight:       Height:         *I personally reviewed and interpreted all ED vitals.    Pulse Ox: 98%, Room air, Normal         Differential Diagnosis/ Diagnostic Considerations: Patient with nausea vomiting and multiple episodes of watery diarrhea with abdominal pain consider gastroenteritis consider dehydration consider colitis consider ischemic colitis    Medical Record Review: I personally reviewed available prior medical records for any recent pertinent discharge summaries, testing, and procedures and reviewed those reports and found primary care visit 1/25/2023 Dr Capellan notes reviewed problem list reviewed.  History of hypertension.    Complicating Factors: The patient already has hypertension which contribute to the complexity of this ED evaluation.    Social determinants of health:    Prescription drug management:      Shared Decision Making:    ED Course: Awaiting CT scan.  Patient had multiple episodes of loose stool while here in the ED.  3 PM endorsed to oncoming physician Dr. Dickinson to follow-up on CT.      Discussion of management with other healthcare providers:    Condition upon leaving the department: Stable                                     Medical Decision Making      Disposition and Plan     Clinical Impression:  1. Intramural aortic hematoma (HCC)    2. Nausea vomiting and diarrhea    3. Abdominal aortic aneurysm dissection (HCC)    4. Superior mesenteric artery thrombosis (HCC)    5. Occlusion of celiac artery         Disposition:  Send to or/cath/gi  4/10/2024  3:59 pm    Follow-up:  No  follow-up provider specified.  We recommend that you schedule follow up care with a primary care provider within the next three months to obtain basic health screening including reassessment of your blood pressure.      Medications Prescribed:  Current Discharge Medication List

## 2024-04-11 NOTE — ANESTHESIA POSTPROCEDURE EVALUATION
Patient: Karen Sprague    Procedure Summary       Date: 04/10/24 Room / Location: [facility] Interventional [facility]    Anesthesia Start: 1646 Anesthesia Stop:     Procedure: IR ARTERIOGRAM MESENTERIC Diagnosis: (Abdominal Pain, Nausea)    Scheduled Providers: Radha Rodriguez MD; Yomi Paige MD Anesthesiologist: Alexis Peng MD    Anesthesia Type: MAC ASA Status: 4            Anesthesia Type: MAC    Vitals Value Taken Time   /57 04/10/24 1935   Temp 97.3 °F (36.3 °C) 04/10/24 1935   Pulse 97 04/10/24 1936   Resp 19 04/10/24 1936   SpO2 96 % 04/10/24 1936   Vitals shown include unfiled device data.    [facility] AN Post Evaluation:   Patient Evaluated in PACU  Patient Participation: complete - patient participated  Level of Consciousness: awake  Pain Management: adequate  Airway Patency:patent  Dental exam unchanged from preop  Yes    Cardiovascular Status: acceptable  Respiratory Status: acceptable  Postoperative Hydration acceptable      ALEXIS PENG MD  4/10/2024 7:36 PM

## 2024-04-11 NOTE — PROGRESS NOTES
[facility] Hospitalist Progress Note     CC: Hospital Follow up    PCP: KARRI WISE JR       Assessment/Plan:     Principal Problem:    Intramural aortic hematoma (HCC)  Active Problems:    Nausea vomiting and diarrhea    Abdominal aortic aneurysm dissection (HCC)    Superior mesenteric artery thrombosis (HCC)    Occlusion of celiac artery      Ms. Sprague is an 89 yo F with PMH of HTN, HL who presented with abdominal pain, found to have aortic mural hematoma occluding SMA and celiac artery.     Aortic mural hematoma occluding SMA and celiac artery  - vascular surgery now S/P percutaneous mechanical thrombectomy and extirpation of matter from the aorta celiac artery SMA right popliteal artery and anterior tibial artery  -Status post celiac and SMA angioplasty and stenting  -Continue IV heparin  -Monitor in ICU  -Ongoing pain seems to be an issue discussed with vascular some pain expected due to embolus and ischemic disease burden  -Last GI to comment  -Continue Plavix and heparin drip  -Blood pressure control as needed  Will add PRN hydral   PRN pain meds     HTN emergency  - BP still elevated   Add Prn hydralazine   -Consider adding additional agents, seems pain is main  of BP   Add lisinopril 10 daily       Atrial fibrillation  - Continue home med sotalol  - Ef 50 %   -elevated trop cards following     HL  - statin- Lipitor held due to elevated LFTs       Shock Liver - trend LFTs, likely perfusion injury   PRN pain meds  Will have GI see as well     Mild dementia per daughter   Avoid higher doses of narcotics if able  AMS- - probably related to delirium due to above       ACP  - CODE- DNR/full- confirmed with children  - POA- daughter and son- updated , called daughter on phone   - lives with family     FN:  - IVF: NS- reduce dose to 75   - Diet: NPO      Prophylaxis:   DVT with Heparin gtt     Dispo: Monitor in ICU   Code status: DNAR     Questions/concerns were discussed with  patient and/or family by bedside.      Thank You,  Sly Gonzalez, DO    Hospitalist with [facility]     Subjective:     Ongoing abd pain, nausea, not eating , no fevers or chills, no SOB     OBJECTIVE:    Blood pressure 158/71, pulse 113, temperature 97.6 °F (36.4 °C), temperature source Temporal, resp. rate 23, height 5' (1.524 m), weight 144 lb 6.4 oz (65.5 kg), SpO2 96%.    Temp:  [96 °F (35.6 °C)-97.6 °F (36.4 °C)] 97.6 °F (36.4 °C)  Pulse:  [] 113  Resp:  [14-26] 23  BP: (126-193)/() 158/71  SpO2:  [91 %-99 %] 96 %      Intake/Output:    Intake/Output Summary (Last 24 hours) at 4/11/2024 0928  Last data filed at 4/11/2024 0456  Gross per 24 hour   Intake 2440 ml   Output 1200 ml   Net 1240 ml       Last 3 Weights   04/11/24 0400 144 lb 6.4 oz (65.5 kg)   04/10/24 2120 144 lb 6.4 oz (65.5 kg)   04/10/24 1147 140 lb (63.5 kg)   03/18/24 2127 131 lb 2.8 oz (59.5 kg)   03/18/24 1946 160 lb (72.6 kg)       Exam   Gen: No acute distress, lay in bed wrist restraints in place   Pulm: Lungs clear, normal respiratory effort  CV: Heart with regular rate and rhythm  Abd: mildly tender to deep palpation   MSK: no clubbing, no cyanosis      Data Review:       Labs:     Recent Labs   Lab 04/10/24  1220 04/10/24  2006 04/11/24  0313   RBC 5.22 5.27 4.80   HGB 14.4 14.7 13.5   HCT 45.4 46.7 44.4   MCV 87.0 88.6 92.5   MCH 27.6 27.9 28.1   MCHC 31.7 31.5 30.4*   RDW 13.7 13.5 13.5   NEPRELIM 7.14  --   --    WBC 8.3 17.7* 13.7*   .0 130.0* 119.0*         Recent Labs   Lab 04/10/24  1220 04/10/24  1300 04/10/24  2007 04/11/24  0313   *  --  94 138*   BUN  --  21  21 22 22   CREATSERUM 0.75  --  0.64 0.73   EGFRCR 77  --  85 79   CA 9.3  --  8.6* 8.3*     --  142 142   K  --  3.6  3.6 3.8 3.6     --  109 111   CO2 20.0*  --  19.0* 14.0*       Recent Labs   Lab 04/10/24  1220 04/10/24  1300 04/11/24  0313   ALT 54* 63* 1,633*   AST  --  96* 3,079*   ALB 4.5  --  3.5          Imaging:  CT ABDOMEN+PELVIS(CONTRAST ONLY)(CPT=74177)    Result Date: 4/10/2024  CONCLUSION:  1. 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  Interventional radiology consultation recommended. Called to . 2. Interstitial abnormality likely pulmonary interstitial edema.     Dictated by (CST): Ole Chavarria MD on 4/10/2024 at 3:20 PM     Finalized by (CST): Ole Chavarria MD on 4/10/2024 at 3:32 PM             Meds:      sodium chloride  1,000 mL Intravenous Once    sotalol  80 mg Oral Daily    clopidogrel  300 mg Oral Once    clopidogrel  75 mg Oral Daily      lactated ringers      lactated ringers 125 mL/hr at 04/11/24 0455    continuous dose heparin 900 Units/hr (04/11/24 0453)       polyethylene glycol (PEG 3350)    sennosides    bisacodyl    fleet enema    ondansetron    fentaNYL    fentaNYL    HYDROmorphone    HYDROmorphone    HYDROmorphone    morphINE    morphINE    morphINE PF    morphINE **OR** morphINE **OR** morphINE

## 2024-04-11 NOTE — CONSULTS
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 Could this be atrial fib?        PLAN:  ECGs reviewed  Watch for AF  Continue sotalol 80 daily for now. Will try to ask family why she is on this medication. Unclear dosing as well.  Follow BP  echo            --------------------------------------------------------------------------------------------------------------------------------    HPI:         History of HTN, HL presented to ER with abd pain. 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 Had procedure yesterday (no documentation available as to exact details at this time). Currently complains of some abd discomfort as well as knee discomfort.    Troponin elevated. She denies prior heart issues though she is on sotalol. On statin and BP meds. No chest pain, dyspnea, palpitations.            No current outpatient medications on file.      Past Medical History:    Arthritis    History of stomach ulcers     Past Surgical History:   Procedure Laterality Date    Removal gallbladder      Total abdom hysterectomy       No family history on file.   Social History:  Social History     Socioeconomic History    Marital status:    Tobacco Use    Smoking status: Never    Smokeless tobacco: Never   Vaping Use    Vaping status: Never Used   Substance and Sexual Activity    Alcohol use: Never    Drug use: Never     Social Determinants of Health     Financial Resource Strain: Low Risk  (1/25/2023)    [facility]    Overall Financial Resource Strain ([facility])     Difficulty  of Paying Living Expenses: Not hard at all   Food Insecurity: No Food Insecurity (4/10/2024)    Food Insecurity     Food Insecurity: Never true   Transportation Needs: No Transportation Needs (4/10/2024)    Transportation Needs     Lack of Transportation: No   Physical Activity: Inactive (1/25/2023)    Received from [facility]    Exercise Vital Sign     Days of Exercise per Week: 0 days     Minutes of Exercise per Session: 0 min   Stress: No Stress Concern Present (1/25/2023)    Received from [facility]    [employer] [place] [employer] - Occupational Stress Questionnaire     Feeling of Stress : Only a little   Social Connections: Socially Isolated (1/25/2023)    Received from [facility]    Social Connection and Isolation Panel [NHANES]     Frequency of Communication with Friends and Family: More than three times a week     Frequency of Social Gatherings with Friends and Family: Once a week     Attends Cheondoism Services: Never     Active Member of Clubs or Organizations: No     Attends Club or Organization Meetings: Never     Marital Status:    Housing Stability: Low Risk  (4/10/2024)    Housing Stability     Housing Instability: No          Medications:  Current Facility-Administered Medications   Medication Dose Route Frequency    polyethylene glycol (PEG 3350) (Miralax) 17 g oral packet 17 g  17 g Oral Daily PRN    sennosides (Senokot) tab 17.2 mg  17.2 mg Oral Nightly PRN    bisacodyl (Dulcolax) 10 MG rectal suppository 10 mg  10 mg Rectal Daily PRN    fleet enema (Fleet) 7-19 GM/118ML rectal enema 133 mL  1 enema Rectal Once PRN    ondansetron (Zofran) 4 MG/2ML injection 4 mg  4 mg Intravenous Q6H PRN    lactated ringers infusion   Intravenous Continuous    fentaNYL (Sublimaze) 50 mcg/mL injection 25 mcg  25 mcg Intravenous Q5  Min PRN    fentaNYL (Sublimaze) 50 mcg/mL injection 50 mcg  50 mcg Intravenous Q5 Min PRN    HYDROmorphone (Dilaudid) 1 MG/ML injection 0.2 mg  0.2 mg Intravenous Q5 Min PRN    HYDROmorphone (Dilaudid) 1 MG/ML injection 0.4 mg  0.4 mg Intravenous Q5 Min PRN    HYDROmorphone (Dilaudid) 1 MG/ML injection 0.6 mg  0.6 mg Intravenous Q5 Min PRN    morphINE PF 2 MG/ML injection 2 mg  2 mg Intravenous Q10 Min PRN    morphINE PF 4 MG/ML injection 4 mg  4 mg Intravenous Q10 Min PRN    morphINE PF 10 MG/ML injection 6 mg  6 mg Intravenous Q10 Min PRN    morphINE PF 2 MG/ML injection 1 mg  1 mg Intravenous Q2H PRN    Or    morphINE PF 2 MG/ML injection 2 mg  2 mg Intravenous Q2H PRN    Or    morphINE PF 4 MG/ML injection 4 mg  4 mg Intravenous Q2H PRN    clopidogrel (Plavix) tab 300 mg  300 mg Oral Once    clopidogrel (Plavix) tab 75 mg  75 mg Oral Daily    lactated ringers infusion   Intravenous Continuous    heparin (Porcine) 02879 units/250mL infusion PE/DVT/THROMBUS CONTINUOUS  200-3,000 Units/hr Intravenous Continuous           Allergies  No Known Allergies            REVIEW OF SYSTEMS:   GENERAL HEALTH: no fevers  SKIN: denies any unusual skin lesions or rashes   EARS: no recent changes in hearing  EYE: no recent vision changes  THROAT: denies sore throat  RESPIRATORY: denies cough or shortness of breath with exertion  CARDIOVASCULAR: denies chest pain, syncope, or palpitations  GI: abdominal pain  NEURO: denies headaches and focal neurologic symptoms  PSYCH: no recent depression  ENDOCRINE: no change in sleep pattern  HEME: no easy bruising  All other systems reviewed and negative.          EXAM:         TELE: SR          /71   Pulse 113   Temp 97.6 °F (36.4 °C) (Temporal)   Resp 23   Ht 5' (1.524 m)   Wt 144 lb 6.4 oz (65.5 kg)   SpO2 96%   BMI 28.20 kg/m²   Temp (24hrs), Av.9 °F (36.1 °C), Min:96 °F (35.6 °C), Max:97.6 °F (36.4 °C)    Wt Readings from Last 3 Encounters:   24 144 lb 6.4 oz (65.5  kg)   03/18/24 131 lb 2.8 oz (59.5 kg)         Intake/Output Summary (Last 24 hours) at 4/11/2024 0803  Last data filed at 4/11/2024 0456  Gross per 24 hour   Intake 2440 ml   Output 1200 ml   Net 1240 ml         GENERAL: well developed, well nourished, in no apparent distress  SKIN: no rashes  HEENT: atraumatic, normocephalic, throat without erythema  NECK: supple, no bruits  LUNGS: clear to auscultation  CARDIO: RRR without murmur or S3   GI: soft, nontender  EXTREMITIES: no cyanosis, clubbing or edema  NEUROLOGY: alert  PSYCH: cooperative          LABORATORY DATA:               ECG:        ECHO:          Labs:  Recent Labs   Lab 04/10/24  1220 04/10/24  1300 04/10/24  2007 04/11/24  0313   *  --  94 138*   BUN  --  21  21 22 22   CREATSERUM 0.75  --  0.64 0.73   CA 9.3  --  8.6* 8.3*     --  142 142   K  --  3.6  3.6 3.8 3.6     --  109 111   CO2 20.0*  --  19.0* 14.0*     No results for input(s): \"TROP\" in the last 168 hours.  Recent Labs   Lab 04/10/24  1220 04/10/24  2006 04/11/24  0313   RBC 5.22   < > 4.80   HGB 14.4   < > 13.5   HCT 45.4   < > 44.4   MCV 87.0   < > 92.5   MCH 27.6   < > 28.1   MCHC 31.7   < > 30.4*   RDW 13.7   < > 13.5   NEPRELIM 7.14  --   --    WBC 8.3   < > 13.7*   .0   < > 119.0*    < > = values in this interval not displayed.     Recent Labs   Lab 04/10/24  2007 04/10/24  2327 04/11/24  0313   TROPHS 435* 438* 422*       Imaging:  CT ABDOMEN+PELVIS(CONTRAST ONLY)(CPT=74177)    Result Date: 4/10/2024  CONCLUSION:  1. 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  Interventional radiology consultation recommended. Called to . 2. Interstitial abnormality likely pulmonary interstitial edema.     Dictated by (CST): Ole Chavarria MD on 4/10/2024 at 3:20 PM     Finalized by (CST): Ole Chavarria MD on 4/10/2024 at 3:32 PM                Jovon Islas MD

## 2024-04-11 NOTE — OPERATIVE REPORT
Vascular Surgery Op Note  Patients Name:    Karen Sprague    Operating Physician: Jeremiah Dinero MD  CSN:    [id number]                                                           Location:  OR  MRN:     [id number]                                            YOB: 1935    Operation Date:     04/11/2024         Pre-Operative Diagnosis:   1.  Acute mesenteric ischemia     Post-Operative Diagnosis:   1.  Acute mesenteric ischemia        Procedure Performed:   1.  Ultrasound-guided access of the right common femoral artery  2.  Introduction of catheter into aorta  3. Selective catheterization second-order branches of the aorta (celiac artery, SMA)  4.  Mesenteric angiogram with radiologic supervision and interpretation  5.  Percutaneous thrombectomy with extirpation of matter from the aorta, celiac artery, and superior mesenteric artery with 7 Ethiopian lightning penumbra catheter  6.  Intravascular ultrasound of the right external iliac, right common iliac, and aorta with radiologic supervision and interpretation  7.  Angioplasty and stenting of the celiac artery with 6 mm Lifestream stent flared proximally with 10 mm balloon  8.  Angioplasty and stenting of the superior mesenteric artery with placement of 6 mm Lifestream stent flared proximally with 10 mm balloon  9.  Ultrasound-guided access of left common femoral artery  10.  Selective catheterization third order branch of the right lower extremity  11.  Right leg angiogram with radiologic supervision and interpretation  12.  Percutaneous thrombectomy with extirpation of matter from the right popliteal, and right anterior tibial artery with 7 Ethiopian penumbra lightening catheter  13.  Closure of bilateral groin with Pro-glide Perclose sutures (8 Ethiopian right, 7 Ethiopian left).  14.  Ultrasound-guided access of the left anterior tibial artery.  Surgeon:  Jeremiah Dinero MD    Assistant:  Radha Rodriguez MD    Anesthesia:   MAC, Local       EBL: 200cc  thrombectomy     Complications: None    Drains: None     Findings: Mesenteric thrombosis due to dissection and ruptured plaque of the aorta.  Thrombectomy performed with angioplasty and stenting of the celiac and SMA.  Wide patency.  Found to have occlusion of the right popliteal as well and thrombectomy performed.  Widely patent vasculature at completion.  Palpable DP with confirmed Doppler signal bilaterally.  Patient still at high risk given duration of symptoms and affecting the liver and the bowel.  Will continue resuscitation and monitoring closely.  Will continue heparin drip.     Indications for procedure: 89-year-old female who presented with acute mesenteric ischemia.  She was evaluated by my partner in the emergency department and recommended for emergent intervention.  The risk benefits of the procedure were discussed extensively with the family not limited to the risk of bleeding, infection, bowel ischemia, shock liver, further distal embolization and death.  Informed consent was directly obtained.  On the afternoon of April 10 the patient was brought emergently to the operating room and placed in supine position.  MAC anesthesia was initiated without any issues.  The patient was subsequently prepped and draped in the usual sterile fashion.  Time was performed.  With the use the ultrasound I accessed the right common femoral with advancement of a microwire and exchanged for a micro sheath.  I then upsized to placing a J-wire followed by predilated with a 6 Lao sheath and then placed a 7 Lao steerable sheath.  I then advanced the catheter into the aorta and then advanced a 7 Lao penumbra catheter and performed thrombectomy of the aorta and then the proximal celiac and SMA with extirpation of significant amount of matter.  Once this was performed I then utilized the steerable sheath to select the superior mesenteric artery and advanced a Enriquez wire after confirmation of cannulation.  I then  advanced a 6 mm Lifestream stent and performed angioplasty and stenting of the proximal SMA and then flared the proximal extent with 10 mm balloon.  Repeat angiogram revealed wide patency with no further flow-limiting stenosis of this.  I then performed angioplasty and stenting of the celiac artery with placement of 6 mm stent inflated proximally with 10 mm balloon and repeat angiogram revealed wide patency.  I then exchanged for an 8 Scottish sheath in the right groin and performed intravascular ultrasound which was advanced through the right external iliac, right common iliac and aorta and identified some residual thrombus in the aortic wall.  I then placed a penumbra catheter and performed thrombectomy with removal of significant debris.  Repeat intravascular ultrasound revealed complete resolution of the thrombus and there was a dissection flap in the aorta which was the culprit lesion.  Given the location of the dissection I opted to forego stenting as the risks certainly greatly outweigh the benefits.  I felt that she would be best suited with therapeutic anticoagulation alone.  I then closed the access site in the right groin with a Pro-glide Perclose suture which was cinched locked and cut thereby closing the arteriotomy site.  Upon completion there was discrepancy in the pulses in the right lower extremity.  I utilized a micropuncture to access the left common femoral with advancement of a microwire and exchanged for microsheath.  Glidewire advantage was advanced up and over the bifurcation followed by placement of a rim catheter.  Repeat angiogram through the right leg revealed clot within the popliteal.  I then upsized to 7 Scottish sheath and performed thrombectomy with the lightning penumbra 7 Scottish catheter through the popliteal as well as the anterior tibial.  Repeat angiogram revealed wide patency with dramatic improvement of flow to the foot.  Retrograde angiogram on the left showed some sluggish flow.   I accessed the left anterior tibial artery with a 4 Kinyarwanda micropuncture kit and performed angiogram that revealed no thrombus and no flow-limiting stenosis.  At this point I was satisfied and terminate the procedure.  I close the left groin access site with a Pro-glide Perclose suture and manual pressure was held on the distal foot access site.  The patient awoke from anesthesia and was taken to the ICU in stable condition.  All counts were correct at the end the case.       Jeremiah Dinero MD

## 2024-04-11 NOTE — PROGRESS NOTES
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     Scheduled Medications:     sodium chloride  1,000 mL Intravenous Once    sotalol  80 mg Oral Daily    clopidogrel  300 mg Oral Once    clopidogrel  75 mg Oral Daily       Infusing Medications:     lactated ringers      lactated ringers 125 mL/hr at 24 0455    continuous dose heparin 900 Units/hr (24 0453)       PRN Medications:    polyethylene glycol (PEG 3350)    sennosides    bisacodyl    fleet enema    ondansetron    fentaNYL    fentaNYL    HYDROmorphone    HYDROmorphone    HYDROmorphone    morphINE    morphINE    morphINE PF    morphINE **OR** morphINE **OR** morphINE    OBJECTIVE:  /58   Pulse 109   Temp 97.6 °F (36.4 °C) (Temporal)   Resp (!) 32   Ht 152.4 cm (5')   Wt 144 lb 6.4 oz (65.5 kg)   SpO2 96%   BMI 28.20 kg/m²    Temp (24hrs), Av.9 °F (36.1 °C), Min:96 °F (35.6 °C), Max:97.6 °F (36.4 °C)             Wt Readings from Last 3 Encounters:   24 144 lb 6.4 oz (65.5 kg)   24 131 lb 2.8 oz (59.5 kg)       I/O last 3 completed shifts:  In: 2440 [I.V.:2420; IV PIGGYBACK:20]  Out: 1200 [Urine:1050; Blood:150]  No intake/output data recorded.     O2: RA  General: NAD.   Neuro: Alert, no focal deficits.    HEENT: PERRL  Neck : No LAD  CV: irregular irregular rhythm nl S1, S2, no S4, S3 or murmur.   Lungs: Clear   Abd: Nontender, non distended.   Ext: No edema.   Skin: No rashes.     Recent Labs   Lab  04/10/24  1220 04/10/24  2006 04/11/24  0313   WBC 8.3 17.7* 13.7*   HGB 14.4 14.7 13.5   HCT 45.4 46.7 44.4   .0 130.0* 119.0*     Recent Labs   Lab 04/10/24  1220 04/10/24  1300 04/10/24  2007 04/11/24  0313   *  --  94 138*   BUN  --  21  21 22 22   CREATSERUM 0.75  --  0.64 0.73   CA 9.3  --  8.6* 8.3*     --  142 142   K  --  3.6  3.6 3.8 3.6     --  109 111   CO2 20.0*  --  19.0* 14.0*   AST  --  96*  --  3,079*   ALT 54* 63*  --  1,633*   ALB 4.5  --   --  3.5     Recent Labs   Lab 04/10/24  1220 04/10/24  2007 04/11/24  0313   INR  --   --  1.78*   PTT 28.5 113.6* 206.1*     No results for input(s): \"ABGPHT\", \"IIXBWA6B\", \"WBSIX5K\", \"ABGHCO3\", \"SITE\", \"DEV\", \"THGB\" in the last 168 hours.    COVID-19 Lab Results    COVID-19  Lab Results   Component Value Date    COVID19 Not Detected 04/11/2024       Pro-Calcitonin  No results for input(s): \"PCT\" in the last 168 hours.    Cardiac  No results for input(s): \"TROP\", \"PBNP\" in the last 168 hours.    Creatinine Kinase  No results for input(s): \"CK\" in the last 168 hours.    Inflammatory Markers  No results for input(s): \"CRP\", \"JESSICA\", \"LDH\", \"DDIMER\" in the last 168 hours.    Imaging:   EKG: prelim with aflutter /fib occ sinus beats rate at 150.     Assessment/Plan   Hypertensive crisis - /100 on admission with aortic hematoma as below  PRN Beta blocker for HR and BP.   SBP at least normotensive. Now out of preop hypotensive  SBP goal window.   Abdominal aortic mural hematoma with SMA and celiac artery thrombosis  - vascular surgery and IR consulted, s/p percutaneous thrombectomy 4/10. Note pending.   - BP control as above  - analgesia  CV   Elevated troponin. Plateauing.   Unclear reason for sotalol. Possibly afib, appears atrial arrhythmia today. Will resume sotalol.   Echo pending.   FEN  - NPO pending GI recovery. Start PO once cleared by vascular surgery.   Ppx   Heparin.  Dispo  - at risk of decompensation due to  hypertensive crisis  - Full code.   ICU monitoring.     D/w'd pt and family, daughter at bedside, questions answered.   Critical Care Time greater than: 35 minutes    My best regards,         Jon Ventura MD  [facility] Pulmonary, Critical Care and Sleep Medicine

## 2024-04-11 NOTE — PLAN OF CARE
Restraints started to due to the patient attempting to remove medical equipment.     Problem: Safety Risk - Non-Violent Restraints  Goal: Patient will remain free from self-harm  Description: INTERVENTIONS:  - Apply the least restrictive restraint to prevent harm  - Notify patient and family of reasons restraints applied  - Assess for any contributing factors to confusion (electrolyte disturbances, delirium, medications)  - Discontinue any unnecessary medical devices as soon as possible  - Assess the patient's physical comfort, circulation, skin condition, hydration, nutrition and elimination needs   - Reorient and redirection as needed  - Assess for the need to continue restraints  Outcome: Progressing

## 2024-04-11 NOTE — BRIEF OP NOTE
Vascular Surgery Cath Lab Brief Op Note  Patients Name:  Karen Sprague  Operating Physician: Radha Rodriguez MD  Location:  Cath Lab  MRN:   [id number]                                            YOB: 1935    Operation Date:  April 11, 2024           Pre-Operative Diagnosis:   Acute mesenteric ischemia with occlusion of the celiac artery and SMA     Post-Operative Diagnosis:   Same  Right popliteal embolus     Procedure Performed:   Ultrasound guided access of the bilateral common femoral arteries and left DP artery   Aortogram, mesenteric angiogram   Bilateral lower extremity 3rd order angiogram  Percutaneous mechanical thrombectomy with extirpation of matter from the aorta, celiac artery, SMA, right popliteal artery and anterior tibial artery  Celiac and SMA angioplasty and stenting   IVUS infrarenal aorta   Radiographic supervision and interpretation    Surgeon: Jeremiah Dinero MD   Assistant surgeon: Radha Rodriguez MD    Anesthesia:   MAC     EBL: 200mL thrombectomy volume     Complications: None apparent     Findings: Ruptured aortic plaque causing dissection flap with localized thrombosis of the celiac and SMA. Celiac and SMA widely patent following intervention. Embolization to the right popliteal artery. Resolved s/p intervention. Palpable DP pulses bilaterally      Plan: Bedrest x4 hours   Continue heparin drip   Supportive care        Radha Rodriguez MD  04/11/24  10:07 AM

## 2024-04-11 NOTE — PROGRESS NOTES
[facility]  part of [facility]     Vascular Surgery Progress Note    Karen Sprague Patient Status:  Inpatient    4/10/1935 MRN [id number]   Location [facility] 2W/SW Attending Sly Gonzalez DO   Hosp Day # 1 PCP KARIR WISE JR     Objective:   Temp: 97.6 °F (36.4 °C)  Pulse: 113  Resp: 23  BP: 158/71    Intake/Output:     Intake/Output Summary (Last 24 hours) at 2024 0859  Last data filed at 2024 0456  Gross per 24 hour   Intake 2440 ml   Output 1200 ml   Net 1240 ml         Events Yesterday/Overnight:  No acute events overnight. Patient  having some bloody appearing stools after procedure. Still continues to complain of abdominal pain. Denies pain in her legs.     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, now s/p angiogram, celiac and SMA stenting, bilateral lower extremity angiogram and percutaneous thrombectomy of popliteal thrombus on the right   - Continue IV fluids for mesenteric ischemia and shock liver; will bolus 1 liter now   - continue antibiotics   - continue heparin drip   - appreciate cardiology consult   - keep patient NPO for now- ok for sips of water   - ok to get out of bed       Medications:  Current Facility-Administered Medications   Medication Dose Route Frequency    sodium chloride 0.9 % IV bolus 1,000 mL  1,000 mL Intravenous Once    sotalol (Betapace) tab 80 mg  80 mg Oral Daily    polyethylene glycol (PEG 3350) (Miralax) 17 g oral packet 17 g  17 g Oral Daily PRN    sennosides (Senokot) tab 17.2 mg  17.2 mg Oral Nightly PRN    bisacodyl (Dulcolax) 10 MG rectal suppository 10 mg  10 mg Rectal Daily PRN    fleet enema (Fleet) 7-19  GM/118ML rectal enema 133 mL  1 enema Rectal Once PRN    ondansetron (Zofran) 4 MG/2ML injection 4 mg  4 mg Intravenous Q6H PRN    lactated ringers infusion   Intravenous Continuous    fentaNYL (Sublimaze) 50 mcg/mL injection 25 mcg  25 mcg Intravenous Q5 Min PRN    fentaNYL (Sublimaze) 50 mcg/mL injection 50 mcg  50 mcg Intravenous Q5 Min PRN    HYDROmorphone (Dilaudid) 1 MG/ML injection 0.2 mg  0.2 mg Intravenous Q5 Min PRN    HYDROmorphone (Dilaudid) 1 MG/ML injection 0.4 mg  0.4 mg Intravenous Q5 Min PRN    HYDROmorphone (Dilaudid) 1 MG/ML injection 0.6 mg  0.6 mg Intravenous Q5 Min PRN    morphINE PF 2 MG/ML injection 2 mg  2 mg Intravenous Q10 Min PRN    morphINE PF 4 MG/ML injection 4 mg  4 mg Intravenous Q10 Min PRN    morphINE PF 10 MG/ML injection 6 mg  6 mg Intravenous Q10 Min PRN    morphINE PF 2 MG/ML injection 1 mg  1 mg Intravenous Q2H PRN    Or    morphINE PF 2 MG/ML injection 2 mg  2 mg Intravenous Q2H PRN    Or    morphINE PF 4 MG/ML injection 4 mg  4 mg Intravenous Q2H PRN    clopidogrel (Plavix) tab 300 mg  300 mg Oral Once    clopidogrel (Plavix) tab 75 mg  75 mg Oral Daily    lactated ringers infusion   Intravenous Continuous    heparin (Porcine) 32088 units/250mL infusion PE/DVT/THROMBUS CONTINUOUS  200-3,000 Units/hr Intravenous Continuous       Laboratory/Data:    LABS:  Recent Labs   Lab 04/10/24  1220 04/10/24  2006 04/11/24  0313   WBC 8.3 17.7* 13.7*   HGB 14.4 14.7 13.5   MCV 87.0 88.6 92.5   .0 130.0* 119.0*   INR  --   --  1.78*       Recent Labs   Lab 04/10/24  1220 04/10/24  1300 04/10/24  2007 04/11/24  0313     --  142 142   K  --  3.6  3.6 3.8 3.6     --  109 111   CO2 20.0*  --  19.0* 14.0*   BUN  --  21  21 22 22   CREATSERUM 0.75  --  0.64 0.73   *  --  94 138*   CA 9.3  --  8.6* 8.3*   MG  --  2.1  --   --      Recent Labs   Lab 04/10/24  1220 04/10/24  2007 04/11/24  0313   PTP  --   --  21.8*   INR  --   --  1.78*   PTT 28.5 113.6* 206.1*      Recent Labs   Lab 04/10/24  1220 04/10/24  1300 04/11/24  0313   ALT 54* 63* 1,633*   AST  --  96* 3,079*   ALB 4.5  --  3.5     No results for input(s): \"TROP\" in the last 168 hours.  No results found for: \"ANAS\", \"MELLO\", \"ANASCRN\"  No results for input(s): \"PCACT\", \"PSACT\", \"AT3ACT\", \"HIPAB\", \"PATHI\", \"STALA\", \"DRVVTRATIO\", \"DRVVT\", \"STACLOT\", \"CARIGG\", \"W5HX6ELXDS\", \"V1GZ5DPXIU\", \"RA\", \"HAVIGM\", \"HBCIGM\", \"HCVAB\", \"HBSAG\", \"HBCAB\", \"HBVDNAINTERP\", \"ANAS\", \"C3\", \"C4\" in the last 168 hours.    Radha Rodriguez MD  4/11/2024  8:59 AM

## 2024-04-11 NOTE — CONSULTS
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  Associated with this, she did have an episode of diarrhea as well as nausea and vomiting.  Because of the pain, the patient presented to the emergency room.  A CT scan revealed aortic mural hematoma that was occluding both the SMA and celiac artery.     The patient had a mesenteric angiogram with percutaneous mechanical thrombectomy.  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 The SMA and celiac arteries were widely patent following the thrombectomy.    The patient continues to complain of diffuse abdominal pain.  The pain is not as severe as prior to her angiogram but has persisted.  She did have nausea and vomiting of a small amount of material today.  She has not had a bowel movement.  There is no rectal bleeding.  The pain is continuous in nature.    Past Medical History:     Past Medical History:    Arthritis    History of stomach ulcers       Medications:     Prior to Admission medications    Medication Sig Start Date End Date Taking? Authorizing Provider   HYDROcodone-acetaminophen 5-325 MG Oral Tab Take 1 tablet by mouth every 6 (six) hours as needed. 3/18/24 3/25/24  Vandana Hagan MD   diclofenac (VOLTAREN) 1 % External Gel Apply 2 g topically 4 (four) times daily. 3/18/24   Vandana Hagan MD   HYDROcodone-acetaminophen 5-325 MG Oral Tab Take 1 tablet by mouth every 6 (six) hours as needed. 3/18/24 3/25/24  Bentley  MD Vandana   ondansetron 4 MG Oral Tablet Dispersible Take 1 tablet (4 mg total) by mouth every 4 (four) hours as needed for Nausea. 3/18/24 3/25/24  Vandana Hagan MD       Current Facility-Administered Medications   Medication Dose Route Frequency    sotalol (Betapace) tab 80 mg  80 mg Oral Daily    metoprolol (Lopressor) 5 mg/5mL injection 5 mg  5 mg Intravenous Q4H PRN    dilTIAZem 10 mg BOLUS FROM BAG infusion  10 mg Intravenous Q1H PRN    dilTIAZem (cardIZEM) 100 mg in sodium chloride 0.9% 100 mL IVPB-ADDV  2.5-20 mg/hr Intravenous Continuous    acetaminophen (Ofirmev) 10 mg/mL infusion premix 1,000 mg  1,000 mg Intravenous Q6H PRN    hydrALAzine (Apresoline) 20 mg/mL injection 5 mg  5 mg Intravenous Q6H PRN    polyethylene glycol (PEG 3350) (Miralax) 17 g oral packet 17 g  17 g Oral Daily PRN    sennosides (Senokot) tab 17.2 mg  17.2 mg Oral Nightly PRN    bisacodyl (Dulcolax) 10 MG rectal suppository 10 mg  10 mg Rectal Daily PRN    fleet enema (Fleet) 7-19 GM/118ML rectal enema 133 mL  1 enema Rectal Once PRN    ondansetron (Zofran) 4 MG/2ML injection 4 mg  4 mg Intravenous Q6H PRN    morphINE PF 2 MG/ML injection 2 mg  2 mg Intravenous Q10 Min PRN    morphINE PF 2 MG/ML injection 1 mg  1 mg Intravenous Q2H PRN    Or    morphINE PF 2 MG/ML injection 2 mg  2 mg Intravenous Q2H PRN    Or    morphINE PF 4 MG/ML injection 4 mg  4 mg Intravenous Q2H PRN    clopidogrel (Plavix) tab 300 mg  300 mg Oral Once    clopidogrel (Plavix) tab 75 mg  75 mg Oral Daily    lactated ringers infusion   Intravenous Continuous    heparin (Porcine) 55043 units/250mL infusion PE/DVT/THROMBUS CONTINUOUS  200-3,000 Units/hr Intravenous Continuous        Family History:     No family history on file.    Social History:    Social History     Socioeconomic History    Marital status:    Tobacco Use    Smoking status: Never    Smokeless tobacco: Never   Vaping Use    Vaping status: Never Used   Substance and Sexual Activity     Alcohol use: Never    Drug use: Never     Social Determinants of Health     Financial Resource Strain: Low Risk  (1/25/2023)    [facility]    Overall Financial Resource Strain (CARDIA)     Difficulty of Paying Living Expenses: Not hard at all   Food Insecurity: No Food Insecurity (4/10/2024)    Food Insecurity     Food Insecurity: Never true   Transportation Needs: No Transportation Needs (4/10/2024)    Transportation Needs     Lack of Transportation: No   Physical Activity: Inactive (1/25/2023)    [facility]    Exercise Vital Sign     Days of Exercise per Week: 0 days     Minutes of Exercise per Session: 0 min   Stress: No Stress Concern Present (1/25/2023)    Received from [facility]    [employer] [place] [employer] - Occupational Stress Questionnaire     Feeling of Stress : Only a little   Social Connections: Socially Isolated (1/25/2023)    [facility] from [facility]    Social Connection and Isolation Panel [NHANES]     Frequency of Communication with Friends and Family: More than three times a week     Frequency of Social Gatherings with Friends and Family: Once a week     Attends Denominational Services: Never     Active Member of Clubs or Organizations: No     Attends Club or Organization Meetings: Never     Marital Status:    Housing Stability: Low Risk  (4/10/2024)    Housing Stability     Housing Instability: No       ROS:     A comprehensive 10 point review of systems was completed.  Pertinent positives and negatives noted in the the HPI.      Physical Exam:    Vital Signs:   Vitals:    04/11/24 1400   BP: 158/75   Pulse: 102   Resp: 22   Temp:      General: Alert, oriented and in no acute distress  HEENT: normocephalic; non-icteric; oral cavity  free of lesions  Neck:  Supple; no thyromegaly or adenopathy  Lungs:  Clear to ausculation and percussion  Heart:  Normal S1S2  Abdomen:  Soft; bowel sounds are absent; non-tender; no masses or hepatosplenomegaly; non-distended  Extremities:  No edema    Labs:      Lab Results   Component Value Date    WBC 13.7 04/11/2024    HGB 13.5 04/11/2024    HCT 44.4 04/11/2024    .0 04/11/2024    CREATSERUM 0.73 04/11/2024    BUN 22 04/11/2024     04/11/2024    K 3.6 04/11/2024     04/11/2024    CO2 14.0 04/11/2024     04/11/2024    CA 8.3 04/11/2024    ALB 3.5 04/11/2024    ALKPHO 60 04/11/2024    BILT 2.5 04/11/2024    TP 5.7 04/11/2024    AST 3,079 04/11/2024    ALT 1,633 04/11/2024    PTT >240.0 04/11/2024    INR 1.78 04/11/2024    PTP 21.8 04/11/2024        RADIOLOGY:    PROCEDURE:CT ABDOMEN + PELVIS (CONTRAST ONLY) (CPT=74177)     COMPARISON:None.     INDICATIONS:Diffuse abdominal pain with nausea.     TECHNIQUE:    CT images of the abdomen and pelvis were obtained with non-ionic intravenous contrast material.  Automated exposure control for dose reduction was used. Adjustment of the mA and/or kV was done based on the patient's size. Use of iterative   reconstruction technique for dose reduction was used.  Dose information is transmitted to the ACR (American College of Radiology) NRDR (National Radiology Data Registry) which includes the Dose Index Registry.     FINDINGS:          LIVER:Hepatic steatosis.  BILIARY:            Post cholecystectomy.    SPLEEN:           Normal.    PANCREAS:      Normal.    ADRENALS:      Normal.   KIDNEYS:          A 1.6 cm left upper pole renal cyst and few additional bilateral renal lesions which probably represent smaller cysts requiring no additional imaging evaluation.    AORTA/VASCULAR:      Focal eccentric mural hematoma associated with thrombus occluding the origin of the celiac and SMA.  Coronary artery calcification.  Retroaortic left renal  vein.  LYMPHADENOPATHY: None.  GI/MESENTERY:           No pneumatosis or portal venous gas.  Liquid type stool in the right-side of the colon.  Appendix is not identified.  No pneumatosis, portal venous gas or mesenteric gas.  Colonic diverticulosis.  No obstruction, bowel wall thickening, or   mesenteric mass.   ABDOMINAL WALL:      No mass or hernia.    URINARY BLADDER:    Normal.   ASCITES:          None.  PELVIC ORGANS:         No suspect pelvic mass.  Post hysterectomy.  BONES:             No suspect bone lesion.  Grade 1 degenerative spondylolisthesis of L5 on S1.  LUNG BASES:  Reticular interstitial prominence with septal line thickening suggesting interstitial edema.  Atelectasis and or scar in the lingula and middle lobe.  OTHER:Left atrial enlargement.    =====  CONCLUSION:   1. 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  Interventional radiology consultation recommended. Called to .  2. Interstitial abnormality likely pulmonary interstitial edema.             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  She continues to have abdominal pain.  I am concerned about intestinal ischemia with reperfusion injury.  The patient does have leukocytosis but it is improving.  She is currently on heparin.    Additionally, she has significant elevation of transaminases.  I suspect this is secondary to ischemic hepatitis.  The patient has no previous history of liver disease.    Plan:    Continue heparin.  CTA of abdomen today.   No role for EGD or colonoscopy.  Follow patient's clinical course.   Discussed with the  patient and her family. They verbalize understanding of the above and all questions were answered.    45 minutes were spent in consultation with this critically ill patient.       Jeremiah Monae MD

## 2024-04-11 NOTE — PLAN OF CARE
Dr. Dinero (on call vascular surgeon) called and made aware of the patients bloody appearing bowel movements prior to initiating the heparin gtt. Orders were given to still start the heparin gtt.        Problem: Safety Risk - Non-Violent Restraints  Goal: Patient will remain free from self-harm  Description: INTERVENTIONS:  - Apply the least restrictive restraint to prevent harm  - Notify patient and family of reasons restraints applied  - Assess for any contributing factors to confusion (electrolyte disturbances, delirium, medications)  - Discontinue any unnecessary medical devices as soon as possible  - Assess the patient's physical comfort, circulation, skin condition, hydration, nutrition and elimination needs   - Reorient and redirection as needed  - Assess for the need to continue restraints  4/11/2024 0228 by Kat Hdez, RN  Outcome: Progressing  4/11/2024 0104 by Kat Hdez, RN  Outcome: Progressing     Problem: CARDIOVASCULAR - ADULT  Goal: Maintains optimal cardiac output and hemodynamic stability  Description: INTERVENTIONS:  - Monitor vital signs, rhythm, and trends  - Monitor for bleeding, hypotension and signs of decreased cardiac output  - Evaluate effectiveness of vasoactive medications to optimize hemodynamic stability  - Monitor arterial and/or venous puncture sites for bleeding and/or hematoma  - Assess quality of pulses, skin color and temperature  - Assess for signs of decreased coronary artery perfusion - ex. Angina  - Evaluate fluid balance, assess for edema, trend weights  Outcome: Progressing  Goal: Absence of cardiac arrhythmias or at baseline  Description: INTERVENTIONS:  - Continuous cardiac monitoring, monitor vital signs, obtain 12 lead EKG if indicated  - Evaluate effectiveness of antiarrhythmic and heart rate control medications as ordered  - Initiate emergency measures for life threatening arrhythmias  - Monitor electrolytes and administer replacement therapy as  ordered  Outcome: Progressing     Problem: RESPIRATORY - ADULT  Goal: Achieves optimal ventilation and oxygenation  Description: INTERVENTIONS:  - Assess for changes in respiratory status  - Assess for changes in mentation and behavior  - Position to facilitate oxygenation and minimize respiratory effort  - Oxygen supplementation based on oxygen saturation or ABGs  - Provide Smoking Cessation handout, if applicable  - Encourage broncho-pulmonary hygiene including cough, deep breathe, Incentive Spirometry  - Assess the need for suctioning and perform as needed  - Assess and instruct to report SOB or any respiratory difficulty  - Respiratory Therapy support as indicated  - Manage/alleviate anxiety  - Monitor for signs/symptoms of CO2 retention  Outcome: Progressing     Problem: PAIN - ADULT  Goal: Verbalizes/displays adequate comfort level or patient's stated pain goal  Description: INTERVENTIONS:  - Encourage pt to monitor pain and request assistance  - Assess pain using appropriate pain scale  - Administer analgesics based on type and severity of pain and evaluate response  - Implement non-pharmacological measures as appropriate and evaluate response  - Consider cultural and social influences on pain and pain management  - Manage/alleviate anxiety  - Utilize distraction and/or relaxation techniques  - Monitor for opioid side effects  - Notify MD/LIP if interventions unsuccessful or patient reports new pain  - Anticipate increased pain with activity and pre-medicate as appropriate  Outcome: Progressing     Problem: GASTROINTESTINAL - ADULT  Goal: Minimal or absence of nausea and vomiting  Description: INTERVENTIONS:  - Maintain adequate hydration with IV or PO as ordered and tolerated  - Nasogastric tube to low intermittent suction as ordered  - Evaluate effectiveness of ordered antiemetic medications  - Provide nonpharmacologic comfort measures as appropriate  - Advance diet as tolerated, if ordered  - Obtain  nutritional consult as needed  - Evaluate fluid balance  Outcome: Progressing  Goal: Maintains or returns to baseline bowel function  Description: INTERVENTIONS:  - Assess bowel function  - Maintain adequate hydration with IV or PO as ordered and tolerated  - Evaluate effectiveness of GI medications  - Encourage mobilization and activity  - Obtain nutritional consult as needed  - Establish a toileting routine/schedule  - Consider collaborating with pharmacy to review patient's medication profile  Outcome: Progressing

## 2024-04-12 NOTE — PROGRESS NOTES
[facility] Hospitalist Progress Note     CC: Hospital Follow up    PCP: KARRI WISE JR       Assessment/Plan:     Principal Problem:    Intramural aortic hematoma (HCC)  Active Problems:    Nausea vomiting and diarrhea    Abdominal aortic aneurysm dissection (HCC)    Superior mesenteric artery thrombosis (HCC)    Occlusion of celiac artery      Ms. Sprague is an 88 yo F with PMH of HTN, HL who presented with abdominal pain, found to have aortic mural hematoma occluding SMA and celiac artery.     Aortic mural hematoma occluding SMA and celiac artery  - vascular surgery now S/P percutaneous mechanical thrombectomy and extirpation of matter from the aorta celiac artery SMA right popliteal artery and anterior tibial artery  -Status post celiac and SMA angioplasty and stenting, repeat imaging reviewed from 4/12 some re-perfusion injury but seems improved clinically today  Trial of CLD today per vascular   -Continue IV heparin  -Ok for floor per pulm   -Ongoing pain seems to be an issue discussed with vascular some pain expected due to embolus and ischemic disease burden, repeat CT reviewed, watch for re-perfusion injury  GI consulted as well   -Continue Plavix and heparin drip  -Blood pressure control as needed  Will add PRN hydral   PRN pain meds     HTN emergency  - BP still elevated   Add Prn hydralazine   -Did add lisinopril but then stopped as BP improved       Atrial fibrillation  - Continue home med sotalol  -Dilt per cards   Cards following   - Ef 50 %   -elevated trop cards following     HL  - statin- Lipitor held due to elevated LFTs       Shock Liver - trend LFTs, likely perfusion injury   PRN pain meds  Trend CMP, LFTs improving     Mild dementia per daughter   Avoid higher doses of narcotics if able  AMS- - probably related to delirium due to above       ACP  - CODE- DNR/full- confirmed with children  - POA- daughter and son- updated , Today discussed with son at bedside   - lives  with family     FN:  - IVF: NS- reduce dose to 75   - Diet: CLD      Prophylaxis:   DVT with Heparin gtt     Dispo: ok for floor   Code status: DNAR     Questions/concerns were discussed with patient and/or family by bedside.  Discussed with GI team and vascular     Thank You,  Sly Gonzalez, DO    Hospitalist with [facility]     Subjective:     Abd pain improved , slept better overnight, no fevers or chills, no chest pains     OBJECTIVE:    Blood pressure 136/54, pulse 73, temperature 97 °F (36.1 °C), temperature source Temporal, resp. rate (!) 35, height 5' (1.524 m), weight 144 lb 6.4 oz (65.5 kg), SpO2 94%.    Temp:  [96.3 °F (35.7 °C)-97 °F (36.1 °C)] 97 °F (36.1 °C)  Pulse:  [] 73  Resp:  [22-35] 35  BP: (122-175)/() 136/54  SpO2:  [93 %-97 %] 94 %      Intake/Output:    Intake/Output Summary (Last 24 hours) at 4/12/2024 1310  Last data filed at 4/12/2024 0600  Gross per 24 hour   Intake 2620.4 ml   Output 700 ml   Net 1920.4 ml       Last 3 Weights   04/11/24 0400 144 lb 6.4 oz (65.5 kg)   04/10/24 2120 144 lb 6.4 oz (65.5 kg)   04/10/24 1147 140 lb (63.5 kg)   03/18/24 2127 131 lb 2.8 oz (59.5 kg)   03/18/24 1946 160 lb (72.6 kg)       Exam   Gen: No acute distress, lay in bed wrist restraints in place   Pulm: Lungs clear, normal respiratory effort  CV: Heart with regular rate and rhythm  Abd: mildly tender to deep palpation   MSK: no clubbing, no cyanosis      Data Review:       Labs:     Recent Labs   Lab 04/10/24  1220 04/10/24  2006 04/11/24  0313 04/12/24  0424   RBC 5.22 5.27 4.80 5.00   HGB 14.4 14.7 13.5 13.8   HCT 45.4 46.7 44.4 44.1   MCV 87.0 88.6 92.5 88.2   MCH 27.6 27.9 28.1 27.6   MCHC 31.7 31.5 30.4* 31.3   RDW 13.7 13.5 13.5 14.5   NEPRELIM 7.14  --   --   --    WBC 8.3 17.7* 13.7* 9.1   .0 130.0* 119.0* 110.0*         Recent Labs   Lab 04/10/24  2007 04/11/24  0313 04/12/24  0424   GLU 94 138* 107*   BUN 22 22 32*   CREATSERUM 0.64 0.73 0.78   EGFRCR 85 79 73    CA 8.6* 8.3* 8.0*    142 140   K 3.8 3.6 3.7    111 113*   CO2 19.0* 14.0* 18.0*       Recent Labs   Lab 04/10/24  1220 04/10/24  1300 04/11/24  0313 04/12/24  0424   ALT 54* 63* 1,633* 1,389*   AST  --  96* 3,079* 1,652*   ALB 4.5  --  3.5 3.2         Imaging:  CTA ABD/PEL (CPT=74174)    Result Date: 4/11/2024  CONCLUSION:  1. Interval placement of celiac and superior mesenteric artery origin stents.  Eccentric mural thrombus in the suprarenal abdominal aorta extending into the lumen of the stents has partially improved from the prior CT and now results in less than 50% in-stent stenosis. 2. 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  However, the splenic artery is patent distally, probably related to collateral supply from the remainder of the celiac axis. 3. Small bowel is mildly prominent compared to the prior CT with development of intraluminal fluid.  There has also been development of edema at the root of the small bowel mesentery that extends to the pancreaticoduodenal groove and peripancreatic region.  While nonspecific, the possibility of recent or developing small bowel ischemia cannot be excluded.  Correlate clinically with symptoms and physical examination.  No pneumatosis or portal venous gas.  Acute pancreatitis, duodenitis or peptic ulcer disease are considered less likely given the patient's history. 4. Residual contrast in both renal cortices from the previous CT consistent with underlying intrinsic renal dysfunction.  Correlate clinically. 5. Development of small bilateral pleural effusions in this patient with cardiomegaly/coronary atherosclerosis, probably related to mild CHF/fluid overload.  There is associated passive atelectasis at both lung bases.  However, pulmonary edema seen on the prior CT has otherwise resolved. 6. Stable stenosis at the origin of the JUJU due to mixed calcified/soft plaque formation. 7.  Developmental variant accessory right renal artery. 8. Status post cholecystectomy.  No biliary ductal dilatation. 9. Status post hysterectomy. 10. Lesser incidental findings as above.  Results of this examination were discussed with the patient's physician, Dr. Sly Gonzalez, by Dr. Odom at 16:40 on 04/11/2024.   [facility]m-remote  Dictated by (CST): Bonifacio Odom MD on 4/11/2024 at 4:12 PM     Finalized by (CST): Bonifacio Odom MD on 4/11/2024 at 4:44 PM          CT ABDOMEN+PELVIS(CONTRAST ONLY)(CPT=74177)    Result Date: 4/10/2024  CONCLUSION:  1. 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  Interventional radiology consultation recommended. Called to . 2. Interstitial abnormality likely pulmonary interstitial edema.     Dictated by (CST): Ole Chavarria MD on 4/10/2024 at 3:20 PM     Finalized by (CST): Ole Chavarria MD on 4/10/2024 at 3:32 PM             Meds:      dilTIAZem  60 mg Oral 4 times per day    sotalol  80 mg Oral Daily    clopidogrel  300 mg Oral Once    clopidogrel  75 mg Oral Daily      dilTIAZem Stopped (04/11/24 1800)    lactated ringers 75 mL/hr at 04/11/24 1406    continuous dose heparin 500 Units/hr (04/12/24 0553)       metoprolol    dilTIAZem    hydrALAzine    polyethylene glycol (PEG 3350)    sennosides    bisacodyl    fleet enema    ondansetron    morphINE    morphINE **OR** morphINE **OR** morphINE

## 2024-04-12 NOTE — CM/SW NOTE
04/12/24 1400   CM/SW Referral Data   Referral Source    Reason for Referral Discharge planning   Informant EMR;Clinical Staff Member   Medical Hx   Does patient have an established PCP? Yes   Patient Info   Patient lives with Alone   Discharge Needs   Anticipated D/C needs To be determined     Patient is not ready for therapy.  Dr. Gonzalez to follow and will provide therapy orders when ready.  POLST form filled out and placed on hard chart.  Dr. Gonzalez to sign when he rounds on 4/13/24.    / to remain available for support and/or discharge planning.      Arely WESTFALLA BSN RN CRRN   RN Case Manager  [phone]

## 2024-04-12 NOTE — SPIRITUAL CARE NOTE
Spiritual Care Visit Note    Patient Name: Karen Sprague Date of Spiritual Care Visit: 24   : 4/10/1935 Primary Dx: Intramural aortic hematoma (HCC)       Referred By: Referral From: Nurse    Spiritual Care Taxonomy:    Intended Effects: Demonstrate caring and concern    Methods: Offer support;Collaborate with care team member         Visit Type/Summary:     - Spiritual Care: Responded to a request for spiritual care and assessed the patient for spiritual care needs. Consulted with RN prior to visit.  attempted to visit pt. Per consult for  support. PT RN stated pt had difficult day and just fell asleep and asked  to let pt rest and not disturb. I will hand off request for  support to overnight  to follow up with RN/pt tonight or tomorrow to offer support if needed.   remains available as needed for follow up.    Spiritual Care support can be requested via an Epic consult. For urgent/immediate needs, please contact the On Call  at: [place]: ext [phone]    Rev. Marino Pat MDiv

## 2024-04-12 NOTE — PROGRESS NOTES
[facility]  GI Progress Note      Karen Sprague Patient Status:  Inpatient    4/10/1935 MRN [id number]   Location [facility] 2W/SW Attending Sly Gonzalez DO   Hosp Day # 2 PCP KARRI WISE JR          SUBJECTIVE:     The patient had less pain overnight according to nursing staff.    OBJECTIVE:    Height: --  Weight: --  BSA (Calculated - sq m): --  Pulse: 73 (700)  BP: 135/52 (700)  Temp: 97 °F (36.1 °C) (0)  Do Not Use - Resp Rate: --  SpO2: 94 % (700)        General: No acute distress  Lungs: Clear  Heart: Normal S1 and S2  Abdomen: Soft, mild diffuse tenderness. Bowel sounds are hypactive.   Extremities: No edema    LAB RESULTS:       Lab Results   Component Value Date    WBC 9.1 2024    HGB 13.8 2024    HCT 44.1 2024    .0 2024    CREATSERUM 0.78 2024    BUN 32 2024     2024    K 3.7 2024     2024    CO2 18.0 2024     2024    CA 8.0 2024    ALB 3.2 2024    ALKPHO 62 2024    BILT 2.0 2024    TP 5.2 2024    AST 1,652 2024    ALT 1,389 2024    PTT 92.2 2024        RADIOLOGY RESULTS:    CTA abdomen:    CONCLUSION:   1. Interval placement of celiac and superior mesenteric artery origin stents.  Eccentric mural thrombus in the suprarenal abdominal aorta extending into the lumen of the stents has partially improved from the prior CT and now results in less than 50%   in-stent stenosis.  2. 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  However, the splenic artery is patent distally, probably related to collateral supply from the remainder of the celiac axis.  3. Small bowel is mildly prominent compared to the prior CT with development of intraluminal fluid.  There has also been development of edema at the root of the small bowel mesentery that extends to the  pancreaticoduodenal groove and peripancreatic   region.  While nonspecific, the possibility of recent or developing small bowel ischemia cannot be excluded.  Correlate clinically with symptoms and physical examination.  No pneumatosis or portal venous gas.  Acute pancreatitis, duodenitis or peptic   ulcer disease are considered less likely given the patient's history.  4. Residual contrast in both renal cortices from the previous CT consistent with underlying intrinsic renal dysfunction.  Correlate clinically.  5. Development of small bilateral pleural effusions in this patient with cardiomegaly/coronary atherosclerosis, probably related to mild CHF/fluid overload.  There is associated passive atelectasis at both lung bases.  However, pulmonary edema seen on   the prior CT has otherwise resolved.  6. Stable stenosis at the origin of the JUJU due to mixed calcified/soft plaque formation.  7. Developmental variant accessory right renal artery.  8. Status post cholecystectomy.  No biliary ductal dilatation.  9. Status post hysterectomy.  10. Lesser incidental findings as above.     Results of this examination were discussed with the patient's physician, Dr. Sly Gonzalez, by Dr. Odom at 16:40 on 04/11/2024.       [employer]-remote      Dictated by (CST): Bonifacio Odom MD on 4/11/2024 at 4:12 PM       Finalized by (CST): Bonifacio Odom MD on 4/11/2024 at 4:44 PM       ASSESSMENT:    Abdominal pain  Mural thrombus of aorta with SMA and celiac artery occlusion  Elevated liver enzymes     CT yesterday  The patient does have leukocytosis but it is improving.  She is currently on heparin.    The CT yesterday shows improvement in the mural thrombus.  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  The small bowel findings are most likely secondary to ischemia.  No  necrosis is identified.    Reperfusion injury is a concern. Her WBC is normal. Platelet count is 110.     Additionally, she has significant elevation of transaminases.  I suspect this is secondary to ischemic hepatitis.  The patient has no previous history of liver disease. Her liver enzymes are improving.     PLAN:    Continue heparin.  Follow labs.  Repeat CT in the next 24 hours to reevaluate the small bowel. If she has worsening symptoms, the CT should be repeated at that time.  Follow clinical course.     Jeremiah oMnae MD

## 2024-04-12 NOTE — PLAN OF CARE
Pt is aox3, resting in bed, bed is low and locked in place. Call light within reach. Pt on clear liq diet, tolerating well. Denies any pain, resting comfortably. Heparin gtt infusing. HR controlled.   1800: Respirations elevated, placed on 2L NC as pt was desat on room air. Given morphine for pain. Dr cardenas notified, CXR and IS ordered   Problem: Safety Risk - Non-Violent Restraints  Goal: Patient will remain free from self-harm  Description: INTERVENTIONS:  - Apply the least restrictive restraint to prevent harm  - Notify patient and family of reasons restraints applied  - Assess for any contributing factors to confusion (electrolyte disturbances, delirium, medications)  - Discontinue any unnecessary medical devices as soon as possible  - Assess the patient's physical comfort, circulation, skin condition, hydration, nutrition and elimination needs   - Reorient and redirection as needed  - Assess for the need to continue restraints  Outcome: Progressing  Problem: Patient Centered Care  Goal: Patient preferences are identified and integrated in the patient's plan of care  Description: Interventions:  - What would you like us to know as we care for you?   - Provide timely, complete, and accurate information to patient/family  - Incorporate patient and family knowledge, values, beliefs, and cultural backgrounds into the planning and delivery of care  - Encourage patient/family to participate in care and decision-making at the level they choose  - Honor patient and family perspectives and choices  Outcome: Progressing       Problem: Safety Risk - Non-Violent Restraints  Goal: Patient will remain free from self-harm  Description: INTERVENTIONS:  - Apply the least restrictive restraint to prevent harm  - Notify patient and family of reasons restraints applied  - Assess for any contributing factors to confusion (electrolyte disturbances, delirium, medications)  - Discontinue any unnecessary medical devices as soon as  possible  - Assess the patient's physical comfort, circulation, skin condition, hydration, nutrition and elimination needs   - Reorient and redirection as needed  - Assess for the need to continue restraints  Outcome: Progressing     Problem: CARDIOVASCULAR - ADULT  Goal: Maintains optimal cardiac output and hemodynamic stability  Description: INTERVENTIONS:  - Monitor vital signs, rhythm, and trends  - Monitor for bleeding, hypotension and signs of decreased cardiac output  - Evaluate effectiveness of vasoactive medications to optimize hemodynamic stability  - Monitor arterial and/or venous puncture sites for bleeding and/or hematoma  - Assess quality of pulses, skin color and temperature  - Assess for signs of decreased coronary artery perfusion - ex. Angina  - Evaluate fluid balance, assess for edema, trend weights  Outcome: Progressing  Goal: Absence of cardiac arrhythmias or at baseline  Description: INTERVENTIONS:  - Continuous cardiac monitoring, monitor vital signs, obtain 12 lead EKG if indicated  - Evaluate effectiveness of antiarrhythmic and heart rate control medications as ordered  - Initiate emergency measures for life threatening arrhythmias  - Monitor electrolytes and administer replacement therapy as ordered  Outcome: Progressing     Problem: RESPIRATORY - ADULT  Goal: Achieves optimal ventilation and oxygenation  Description: INTERVENTIONS:  - Assess for changes in respiratory status  - Assess for changes in mentation and behavior  - Position to facilitate oxygenation and minimize respiratory effort  - Oxygen supplementation based on oxygen saturation or ABGs  - Provide Smoking Cessation handout, if applicable  - Encourage broncho-pulmonary hygiene including cough, deep breathe, Incentive Spirometry  - Assess the need for suctioning and perform as needed  - Assess and instruct to report SOB or any respiratory difficulty  - Respiratory Therapy support as indicated  - Manage/alleviate anxiety  -  Monitor for signs/symptoms of CO2 retention  Outcome: Progressing     Problem: PAIN - ADULT  Goal: Verbalizes/displays adequate comfort level or patient's stated pain goal  Description: INTERVENTIONS:  - Encourage pt to monitor pain and request assistance  - Assess pain using appropriate pain scale  - Administer analgesics based on type and severity of pain and evaluate response  - Implement non-pharmacological measures as appropriate and evaluate response  - Consider cultural and social influences on pain and pain management  - Manage/alleviate anxiety  - Utilize distraction and/or relaxation techniques  - Monitor for opioid side effects  - Notify MD/LIP if interventions unsuccessful or patient reports new pain  - Anticipate increased pain with activity and pre-medicate as appropriate  Outcome: Progressing     Problem: GASTROINTESTINAL - ADULT  Goal: Minimal or absence of nausea and vomiting  Description: INTERVENTIONS:  - Maintain adequate hydration with IV or PO as ordered and tolerated  - Nasogastric tube to low intermittent suction as ordered  - Evaluate effectiveness of ordered antiemetic medications  - Provide nonpharmacologic comfort measures as appropriate  - Advance diet as tolerated, if ordered  - Obtain nutritional consult as needed  - Evaluate fluid balance  Outcome: Progressing  Goal: Maintains or returns to baseline bowel function  Description: INTERVENTIONS:  - Assess bowel function  - Maintain adequate hydration with IV or PO as ordered and tolerated  - Evaluate effectiveness of GI medications  - Encourage mobilization and activity  - Obtain nutritional consult as needed  - Establish a toileting routine/schedule  - Consider collaborating with pharmacy to review patient's medication profile  Outcome: Progressing

## 2024-04-12 NOTE — PROGRESS NOTES
Vascular Surgery Progress Note    No acute events overnight. Patient has been hemodynamically stable. Only needed morphine once overnight. Abdomen remains soft, nontender, nondistended. Feet warm, well perfused. CT reviewed.   88 y/o female with acute mesenteric ischemia s/p percutaneous thrombectomy   - Can trial sips of clears today   - Continue fluid resuscitation, heparin drip, antibiotics   - Will continue to follow     Radha Rodriguez MD  04/12/24  7:52 AM

## 2024-04-12 NOTE — PLAN OF CARE
Pt no longer pulling at lines, relaxed. Aox3 redirectable. Restraints dc  Problem: Safety Risk - Non-Violent Restraints  Goal: Patient will remain free from self-harm  Description: INTERVENTIONS:  - Apply the least restrictive restraint to prevent harm  - Notify patient and family of reasons restraints applied  - Assess for any contributing factors to confusion (electrolyte disturbances, delirium, medications)  - Discontinue any unnecessary medical devices as soon as possible  - Assess the patient's physical comfort, circulation, skin condition, hydration, nutrition and elimination needs   - Reorient and redirection as needed  - Assess for the need to continue restraints  4/12/2024 1539 by Aileen Schmid, RN  Outcome: Completed  4/12/2024 1020 by Aileen Schmid, RN  Outcome: Progressing

## 2024-04-12 NOTE — CONSULTS
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 Although vascular believes thrombus originated from aorta, cannot rule out from PAF.  PAF   Elevated troponin, likely demand ischemia  HTN  HL, on statin PTA          PLAN:  Continue sotalol for now. If continues to have PAF episodes, consider washout sotalol then start amiodarone.  IV heparin for now. Also started on plavix. Change to DOAC prior to discharge  Off IV diltiazem drip. Add oral diltiazem  Continue sotalol 80 daily (outpatient dosing) for now.   Follow BP  Echo reviewed  Watch platelet count        SUBJECTIVE:    Denies chest pain or dyspnea. Back in SR from yesterday evening.        --------------------------------------------------------------------------------------------------------------------------------    HPI:         History of HTN, HL presented to ER with abd pain. 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 Had procedure yesterday (no documentation available as to exact details at this time). Currently complains of some abd discomfort as well as knee discomfort.    Troponin elevated. She denies prior heart issues though she is on sotalol. On statin and BP meds. No chest pain, dyspnea, palpitations.            No current outpatient medications on file.      Past Medical History:    Arthritis    History of stomach ulcers     Past Surgical History:   Procedure Laterality Date    Removal gallbladder      Total abdom hysterectomy       No family history on file.   Social History:  Social History     Socioeconomic History    Marital status:    Tobacco Use    Smoking status: Never    Smokeless  tobacco: Never   Vaping Use    Vaping status: Never Used   Substance and Sexual Activity    Alcohol use: Never    Drug use: Never     Social Determinants of Health     Financial Resource Strain: Low Risk  (1/25/2023)    Received from [facility], [facility]    Overall Financial Resource Strain (CARDIA)     Difficulty of Paying Living Expenses: Not hard at all   Food Insecurity: No Food Insecurity (4/10/2024)    Food Insecurity     Food Insecurity: Never true   Transportation Needs: No Transportation Needs (4/10/2024)    Transportation Needs     Lack of Transportation: No   Physical Activity: Inactive (1/25/2023)    Received from [facility], [facility]    Exercise Vital Sign     Days of Exercise per Week: 0 days     Minutes of Exercise per Session: 0 min   Stress: No Stress Concern Present (1/25/2023)    Received from [facility], [facility]    [employer] - Occupational Stress Questionnaire     Feeling of Stress : Only a little   Social Connections: Socially Isolated (1/25/2023)    Received from [facility], [facility]    Social Connection and Isolation Panel [NHANES]     Frequency of Communication with Friends and Family: More than three times a week     Frequency of Social Gatherings with Friends and Family: Once a week     Attends Quaker Services: Never     Active Member of Clubs or Organizations: No     Attends Club or Organization Meetings: Never     Marital Status:    Housing Stability: Low Risk  (4/10/2024)    Housing Stability     Housing Instability: No          Medications:  Current Facility-Administered Medications   Medication Dose Route Frequency    sotalol (Betapace) tab 80 mg  80 mg Oral Daily    metoprolol (Lopressor) 5 mg/5mL injection 5 mg  5 mg Intravenous Q4H PRN    dilTIAZem 10 mg BOLUS  FROM BAG infusion  10 mg Intravenous Q1H PRN    dilTIAZem (cardIZEM) 100 mg in sodium chloride 0.9% 100 mL IVPB-ADDV  2.5-20 mg/hr Intravenous Continuous    acetaminophen (Ofirmev) 10 mg/mL infusion premix 1,000 mg  1,000 mg Intravenous Q6H PRN    hydrALAzine (Apresoline) 20 mg/mL injection 5 mg  5 mg Intravenous Q6H PRN    polyethylene glycol (PEG 3350) (Miralax) 17 g oral packet 17 g  17 g Oral Daily PRN    sennosides (Senokot) tab 17.2 mg  17.2 mg Oral Nightly PRN    bisacodyl (Dulcolax) 10 MG rectal suppository 10 mg  10 mg Rectal Daily PRN    fleet enema (Fleet) 7-19 GM/118ML rectal enema 133 mL  1 enema Rectal Once PRN    ondansetron (Zofran) 4 MG/2ML injection 4 mg  4 mg Intravenous Q6H PRN    morphINE PF 2 MG/ML injection 2 mg  2 mg Intravenous Q10 Min PRN    morphINE PF 2 MG/ML injection 1 mg  1 mg Intravenous Q2H PRN    Or    morphINE PF 2 MG/ML injection 2 mg  2 mg Intravenous Q2H PRN    Or    morphINE PF 4 MG/ML injection 4 mg  4 mg Intravenous Q2H PRN    clopidogrel (Plavix) tab 300 mg  300 mg Oral Once    clopidogrel (Plavix) tab 75 mg  75 mg Oral Daily    lactated ringers infusion   Intravenous Continuous    heparin (Porcine) 37003 units/250mL infusion PE/DVT/THROMBUS CONTINUOUS  200-3,000 Units/hr Intravenous Continuous           Allergies  No Known Allergies            REVIEW OF SYSTEMS:   GENERAL HEALTH: no fevers  SKIN: denies any unusual skin lesions or rashes   EARS: no recent changes in hearing  EYE: no recent vision changes  THROAT: denies sore throat  RESPIRATORY: denies cough or shortness of breath with exertion  CARDIOVASCULAR: denies chest pain, syncope, or palpitations  GI: abdominal pain  NEURO: denies headaches and focal neurologic symptoms  PSYCH: no recent depression  ENDOCRINE: no change in sleep pattern  HEME: no easy bruising  All other systems reviewed and negative.          EXAM:         TELE:           /58 (BP Location: Left arm)   Pulse 76   Temp 96.8 °F (36 °C)  (Temporal)   Resp 25   Ht 5' (1.524 m)   Wt 144 lb 6.4 oz (65.5 kg)   SpO2 95%   BMI 28.20 kg/m²   Temp (24hrs), Av.6 °F (35.9 °C), Min:96.2 °F (35.7 °C), Max:97 °F (36.1 °C)    Wt Readings from Last 3 Encounters:   24 144 lb 6.4 oz (65.5 kg)   24 131 lb 2.8 oz (59.5 kg)         Intake/Output Summary (Last 24 hours) at 2024 0539  Last data filed at 2024 2200  Gross per 24 hour   Intake 2003.6 ml   Output 550 ml   Net 1453.6 ml         GENERAL: well developed, well nourished, in no apparent distress  SKIN: no rashes  HEENT: atraumatic, normocephalic, throat without erythema  NECK: supple, no bruits  LUNGS: clear to auscultation  CARDIO: RRR without murmur or S3   GI: soft, nontender  EXTREMITIES: no cyanosis, clubbing or edema  NEUROLOGY: alert  PSYCH: cooperative          LABORATORY DATA:               ECG:        ECHO:  1. Study data: Atrial fibrillation   2. Left ventricle: The cavity size was normal. Systolic function was normal.      The estimated ejection fraction was 55-60%, by visual assessment. No      diagnostic evidence for regional wall motion abnormalities. Doppler      parameters are consistent with restrictive physiology, indicative of      decreased left ventricular diastolic compliance and/or increased left      atrial pressure.   3. Left atrium: The left atrial volume was markedly increased.   4. Right atrium: The atrium was mildly to moderately dilated.   5. Tricuspid valve: There was mild regurgitation.   6. Pulmonary arteries: Systolic pressure was within the normal range,      estimated to be 28mm Hg.         Labs:  Recent Labs   Lab 04/10/24  1220 04/10/24  1300 04/10/24  2007 04/11/24  0313   *  --  94 138*   BUN  --  21  21 22 22   CREATSERUM 0.75  --  0.64 0.73   CA 9.3  --  8.6* 8.3*     --  142 142   K  --  3.6  3.6 3.8 3.6     --  109 111   CO2 20.0*  --  19.0* 14.0*     No results for input(s): \"TROP\" in the last 168 hours.  Recent Labs    Lab 04/10/24  1220 04/10/24  2006 04/12/24  0424   RBC 5.22   < > 5.00   HGB 14.4   < > 13.8   HCT 45.4   < > 44.1   MCV 87.0   < > 88.2   MCH 27.6   < > 27.6   MCHC 31.7   < > 31.3   RDW 13.7   < > 14.5   NEPRELIM 7.14  --   --    WBC 8.3   < > 9.1   .0   < > 110.0*    < > = values in this interval not displayed.     Recent Labs   Lab 04/10/24  2007 04/10/24  2327 04/11/24  0313   TROPHS 435* 438* 422*       Imaging:  CTA ABD/PEL (CPT=74174)    Result Date: 4/11/2024  CONCLUSION:  1. Interval placement of celiac and superior mesenteric artery origin stents.  Eccentric mural thrombus in the suprarenal abdominal aorta extending into the lumen of the stents has partially improved from the prior CT and now results in less than 50% in-stent stenosis. 2. 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  However, the splenic artery is patent distally, probably related to collateral supply from the remainder of the celiac axis. 3. Small bowel is mildly prominent compared to the prior CT with development of intraluminal fluid.  There has also been development of edema at the root of the small bowel mesentery that extends to the pancreaticoduodenal groove and peripancreatic region.  While nonspecific, the possibility of recent or developing small bowel ischemia cannot be excluded.  Correlate clinically with symptoms and physical examination.  No pneumatosis or portal venous gas.  Acute pancreatitis, duodenitis or peptic ulcer disease are considered less likely given the patient's history. 4. Residual contrast in both renal cortices from the previous CT consistent with underlying intrinsic renal dysfunction.  Correlate clinically. 5. Development of small bilateral pleural effusions in this patient with cardiomegaly/coronary atherosclerosis, probably related to mild CHF/fluid overload.  There is associated passive atelectasis at both lung bases.  However,  pulmonary edema seen on the prior CT has otherwise resolved. 6. Stable stenosis at the origin of the JUJU due to mixed calcified/soft plaque formation. 7. Developmental variant accessory right renal artery. 8. Status post cholecystectomy.  No biliary ductal dilatation. 9. Status post hysterectomy. 10. Lesser incidental findings as above.  Results of this examination were discussed with the patient's physician, Dr. Sly Gonzalez, by Dr. Odom at 16:40 on 04/11/2024.   [facility]m-remote  Dictated by (CST): Bonifacio Odom MD on 4/11/2024 at 4:12 PM     Finalized by (CST): Bonifacio Odom MD on 4/11/2024 at 4:44 PM          CT ABDOMEN+PELVIS(CONTRAST ONLY)(CPT=74177)    Result Date: 4/10/2024  CONCLUSION:  1. 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  Interventional radiology consultation recommended. Called to . 2. Interstitial abnormality likely pulmonary interstitial edema.     Dictated by (CST): Ole Chavarria MD on 4/10/2024 at 3:20 PM     Finalized by (CST): Ole Chavarria MD on 4/10/2024 at 3:32 PM                Jovon Islas MD

## 2024-04-12 NOTE — PROGRESS NOTES
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 Starting clears.     Scheduled Medications:     sodium chloride  1,000 mL Intravenous Once    sotalol  80 mg Oral Daily    clopidogrel  300 mg Oral Once    clopidogrel  75 mg Oral Daily       Infusing Medications:     lactated ringers      lactated ringers 125 mL/hr at 24 0455    continuous dose heparin 900 Units/hr (24 0453)       PRN Medications:    polyethylene glycol (PEG 3350)    sennosides    bisacodyl    fleet enema    ondansetron    fentaNYL    fentaNYL    HYDROmorphone    HYDROmorphone    HYDROmorphone    morphINE    morphINE    morphINE PF    morphINE **OR** morphINE **OR** morphINE    OBJECTIVE:  /58   Pulse 109   Temp 97.6 °F (36.4 °C) (Temporal)   Resp (!) 32   Ht 152.4 cm (5')   Wt 144 lb 6.4 oz (65.5 kg)   SpO2 96%   BMI 28.20 kg/m²    Temp (24hrs), Av.9 °F (36.1 °C), Min:96 °F (35.6 °C), Max:97.6 °F (36.4 °C)             Wt Readings from Last 3 Encounters:   24 144 lb 6.4 oz (65.5 kg)   24 131 lb 2.8 oz (59.5 kg)       I/O last 3 completed shifts:  In: 2440 [I.V.:2420; IV PIGGYBACK:20]  Out: 1200 [Urine:1050; Blood:150]  No intake/output data recorded.     O2: RA  General: NAD.   Neuro: Alert, no focal deficits.    HEENT: PERRL  Neck : No LAD  CV: irregular irregular rhythm nl S1, S2, no S4, S3 or murmur.   Lungs: Clear   Abd: Nontender, non distended.   Ext: No edema.   Skin: No rashes.     Recent Labs   Lab  04/10/24  1220 04/10/24  2006 04/11/24  0313   WBC 8.3 17.7* 13.7*   HGB 14.4 14.7 13.5   HCT 45.4 46.7 44.4   .0 130.0* 119.0*     Recent Labs   Lab 04/10/24  1220 04/10/24  1300 04/10/24  2007 04/11/24  0313   *  --  94 138*   BUN  --  21  21 22 22   CREATSERUM 0.75  --  0.64 0.73   CA 9.3  --  8.6* 8.3*     --  142 142   K  --  3.6  3.6 3.8 3.6     --  109 111   CO2 20.0*  --  19.0* 14.0*   AST  --  96*  --  3,079*   ALT 54* 63*  --  1,633*   ALB 4.5  --   --  3.5     Recent Labs   Lab 04/10/24  1220 04/10/24  2007 04/11/24  0313   INR  --   --  1.78*   PTT 28.5 113.6* 206.1*     No results for input(s): \"ABGPHT\", \"GRQIVL8D\", \"TXOGJ4M\", \"ABGHCO3\", \"SITE\", \"DEV\", \"THGB\" in the last 168 hours.    COVID-19 Lab Results    COVID-19  Lab Results   Component Value Date    COVID19 Not Detected 04/11/2024       Pro-Calcitonin  No results for input(s): \"PCT\" in the last 168 hours.    Cardiac  No results for input(s): \"TROP\", \"PBNP\" in the last 168 hours.    Creatinine Kinase  No results for input(s): \"CK\" in the last 168 hours.    Inflammatory Markers  No results for input(s): \"CRP\", \"JESSICA\", \"LDH\", \"DDIMER\" in the last 168 hours.    Imaging:   EKG: prelim with aflutter /fib occ sinus beats rate at 150.     Assessment/Plan   Hypertensive crisis - /100 on admission with aortic hematoma as below  PRN Beta blocker for HR and BP.   SBP at least normotensive. Now out of preop hypotensive  SBP goal window.   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   - BP control as above  - analgesia  CV Elevated troponin. Plateauing, SVT   Unclear reason for sotalol. Possibly afib, appears atrial arrhythmia today. Will resume sotalol.   Echo pending.   FEN  - NPO pending GI recovery. Start PO once cleared by vascular surgery.   Ppx   Heparin.  Dispo  Ok for tele from pullm standpoint. Will follow as necessary out  of ICU.   D/w'd pt and family, questions answered.     My best regards,         Jon Ventura MD  [facility] Pulmonary, Critical Care and Sleep Medicine

## 2024-04-13 NOTE — PLAN OF CARE
Patient became bradycardiac, hypotensive, and minimally responsive at approximately 2030. Nocturnalist called to bedside, atropine given x1, dopamine and levophed started. Patient agonally breathing, placed on 15L HFNC. Family called to come to hospital. Family at bedside to discuss plan of care. Family (son and daughter)  decided against drawing additional labs and decided to have the dopamine and levophed discontinued.     Time of death: 0133. Family present at bedside. Attending and consults notified.         Problem: Patient Centered Care  Goal: Patient preferences are identified and integrated in the patient's plan of care  Description: Interventions:  - What would you like us to know as we care for you?   - Provide timely, complete, and accurate information to patient/family  - Incorporate patient and family knowledge, values, beliefs, and cultural backgrounds into the planning and delivery of care  - Encourage patient/family to participate in care and decision-making at the level they choose  - Honor patient and family perspectives and choices  Outcome: Progressing     Problem: Patient/Family Goals  Goal: Patient/Family Long Term Goal  Description: Patient's Long Term Goal:     Interventions:  -   - See additional Care Plan goals for specific interventions  Outcome: Progressing  Goal: Patient/Family Short Term Goal  Description: Patient's Short Term Goal:     Interventions:   -   - See additional Care Plan goals for specific interventions  Outcome: Progressing     Problem: CARDIOVASCULAR - ADULT  Goal: Maintains optimal cardiac output and hemodynamic stability  Description: INTERVENTIONS:  - Monitor vital signs, rhythm, and trends  - Monitor for bleeding, hypotension and signs of decreased cardiac output  - Evaluate effectiveness of vasoactive medications to optimize hemodynamic stability  - Monitor arterial and/or venous puncture sites for bleeding and/or hematoma  - Assess quality of pulses, skin color and  temperature  - Assess for signs of decreased coronary artery perfusion - ex. Angina  - Evaluate fluid balance, assess for edema, trend weights  Outcome: Progressing  Goal: Absence of cardiac arrhythmias or at baseline  Description: INTERVENTIONS:  - Continuous cardiac monitoring, monitor vital signs, obtain 12 lead EKG if indicated  - Evaluate effectiveness of antiarrhythmic and heart rate control medications as ordered  - Initiate emergency measures for life threatening arrhythmias  - Monitor electrolytes and administer replacement therapy as ordered  Outcome: Progressing     Problem: RESPIRATORY - ADULT  Goal: Achieves optimal ventilation and oxygenation  Description: INTERVENTIONS:  - Assess for changes in respiratory status  - Assess for changes in mentation and behavior  - Position to facilitate oxygenation and minimize respiratory effort  - Oxygen supplementation based on oxygen saturation or ABGs  - Provide Smoking Cessation handout, if applicable  - Encourage broncho-pulmonary hygiene including cough, deep breathe, Incentive Spirometry  - Assess the need for suctioning and perform as needed  - Assess and instruct to report SOB or any respiratory difficulty  - Respiratory Therapy support as indicated  - Manage/alleviate anxiety  - Monitor for signs/symptoms of CO2 retention  Outcome: Progressing     Problem: PAIN - ADULT  Goal: Verbalizes/displays adequate comfort level or patient's stated pain goal  Description: INTERVENTIONS:  - Encourage pt to monitor pain and request assistance  - Assess pain using appropriate pain scale  - Administer analgesics based on type and severity of pain and evaluate response  - Implement non-pharmacological measures as appropriate and evaluate response  - Consider cultural and social influences on pain and pain management  - Manage/alleviate anxiety  - Utilize distraction and/or relaxation techniques  - Monitor for opioid side effects  - Notify MD/LIP if interventions  unsuccessful or patient reports new pain  - Anticipate increased pain with activity and pre-medicate as appropriate  Outcome: Progressing     Problem: GASTROINTESTINAL - ADULT  Goal: Minimal or absence of nausea and vomiting  Description: INTERVENTIONS:  - Maintain adequate hydration with IV or PO as ordered and tolerated  - Nasogastric tube to low intermittent suction as ordered  - Evaluate effectiveness of ordered antiemetic medications  - Provide nonpharmacologic comfort measures as appropriate  - Advance diet as tolerated, if ordered  - Obtain nutritional consult as needed  - Evaluate fluid balance  Outcome: Progressing  Goal: Maintains or returns to baseline bowel function  Description: INTERVENTIONS:  - Assess bowel function  - Maintain adequate hydration with IV or PO as ordered and tolerated  - Evaluate effectiveness of GI medications  - Encourage mobilization and activity  - Obtain nutritional consult as needed  - Establish a toileting routine/schedule  - Consider collaborating with pharmacy to review patient's medication profile  Outcome: Progressing

## 2024-04-13 NOTE — DISCHARGE SUMMARY
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 Mercado  Ms. Sprague is an 89 yo F with PMH of HTN, HL who presented with abdominal pain. Patient states symptoms started about 2 days ago, +nausea, +diarrhea. Familiy at bedside.     In the ED, BP elevated, labs with mildly elevated LFTs and troponin. CT A/P with mural hematoma with thrombosis of SMA and celiac artery origin.     Hospital Course:   She was admitted for abdominal pain found to have acute aortic mural hematoma occluding the SMA and celiac artery vascular surgery was consulted and underwent a percutaneous mechanical thrombectomy and extirpation of matter of the aortic celiac artery.  SMA right popliteal artery and anterior tibial artery, patient was initially stable after procedure however in the evening of  patient acutely decompensated developed agonal breathing, extensive discussions were undertaken with family ultimately they decided to keep mom comfortable, patient passed away .       Operative Procedures:      Imaging: XR CHEST AP  PORTABLE  (CPT=71045)    Result Date: 4/12/2024  CONCLUSION:   Elevated left hemidiaphragm and associated atelectasis.  Superimposed consolidation is not excluded.  Mild cardiomegaly.    Dictated by (CST): Deandre Thornton MD on 4/12/2024 at 7:45 PM     Finalized by (CST): Deandre Thornton MD on 4/12/2024 at 7:46 PM          CTA ABD/PEL (CPT=74174)    Result Date: 4/11/2024  CONCLUSION:  1. Interval placement of celiac and superior mesenteric artery origin stents.  Eccentric mural thrombus in the suprarenal abdominal aorta extending into the lumen of the stents has partially improved from the prior CT and now results in less than 50% in-stent stenosis. 2. 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  However, the splenic artery is patent distally, probably related to collateral supply from the remainder of the celiac axis. 3. Small bowel is mildly prominent compared to the prior CT with development of intraluminal fluid.  There has also been development of edema at the root of the small bowel mesentery that extends to the pancreaticoduodenal groove and peripancreatic region.  While nonspecific, the possibility of recent or developing small bowel ischemia cannot be excluded.  Correlate clinically with symptoms and physical examination.  No pneumatosis or portal venous gas.  Acute pancreatitis, duodenitis or peptic ulcer disease are considered less likely given the patient's history. 4. Residual contrast in both renal cortices from the previous CT consistent with underlying intrinsic renal dysfunction.  Correlate clinically. 5. Development of small bilateral pleural effusions in this patient with cardiomegaly/coronary atherosclerosis, probably related to mild CHF/fluid overload.  There is associated passive atelectasis at both lung bases.  However, pulmonary edema seen on the prior CT has otherwise resolved. 6. 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 7. Developmental variant accessory right renal artery. 8. Status post cholecystectomy.  No biliary ductal dilatation. 9. Status post hysterectomy. 10. Lesser incidental findings as above.  Results of this examination were discussed with the patient's physician, Dr. Sly Gonzalez, by Dr. Odom at 16:40 on 04/11/2024.   [facility]  Dictated by (CST): Bonifacio Odom MD on 4/11/2024 at 4:12 PM     Finalized by (CST): Bonifacio Odom MD on 4/11/2024 at 4:44 PM          CT ABDOMEN+PELVIS(CONTRAST ONLY)(CPT=74177)    Result Date: 4/10/2024  CONCLUSION:  1. 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  Interventional radiology consultation recommended. Called to . 2. Interstitial abnormality likely pulmonary interstitial edema.     Dictated by (CST): Ole Chavarria MD on 4/10/2024 at 3:20 PM     Finalized by (CST): Ole Chavarria MD on 4/10/2024 at 3:32 PM                 Thank You,    Sly Gonzalez, DO   Hospitalist with [facility] and [facility]

## 2024-04-13 NOTE — SPIRITUAL CARE NOTE
Family requested [employer] Last Rites, On call  called and is coming in.  prayed at bedside and provide family with emotional support. This call was initiated by the family through the RN.

## (undated) NOTE — LETTER
[facility]  Administration of Anesthesia  Karen CARABALLO agree to be cared for by a physician anesthesiologist alone and/or with a nurse anesthetist, who is specially trained to monitor me and give me medicine to put me to sleep or keep me comfortable during my procedure    I understand that my anesthesiologist and/or anesthetist is not an employee or agent of [facility] or [employer]. He or she works for [facility]    As the patient asking for anesthesia services, I agree to:  Allow the anesthesiologist (anesthesia doctor) to give me medicine and do additional procedures as necessary. Some examples are: Starting or using an “IV” to give me medicine, fluids or blood during my procedure, and having a breathing tube placed to help me breathe when I’m asleep (intubation). In the event that my heart stops working properly, I understand that my anesthesiologist will make every effort to sustain my life, unless otherwise directed by [facility] Do Not Resuscitate documents.  Tell my anesthesia doctor before my procedure:  If I am pregnant.  The last time that I ate or drank.  iii. All of the medicines I take (including prescriptions, herbal supplements, and pills I can buy without a prescription (including street drugs/illegal medications). Failure to inform my anesthesiologist about these medicines may increase my risk of anesthetic complications.  iv.If I am allergic to anything or have had a reaction to anesthesia before.  I understand how the anesthesia medicine will help me (benefits).  I understand that with any type of anesthesia medicine there are risks:  The most common risks are: nausea, vomiting, sore throat, muscle soreness, damage to my eyes, mouth, or teeth (from breathing tube placement).  Rare risks include: remembering what happened during my procedure, allergic reactions to medications, injury to my airway, heart, lungs, vision, nerves, or  muscles and in extremely rare instances death.  My doctor has explained to me other choices available to me for my care (alternatives).  Pregnant Patients (“epidural”):  I understand that the risks of having an epidural (medicine given into my back to help control pain during labor), include itching, low blood pressure, difficulty urinating, headache or slowing of the baby’s heart. Very rare risks include infection, bleeding, seizure, irregular heart rhythms and nerve injury.  Regional Anesthesia (“spinal”, “epidural”, & “nerve blocks”):  I understand that rare but potential complications include headache, bleeding, infection, seizure, irregular heart rhythms, and nerve injury.    _____________________________________________________________________________  Patient (or Representative) Signature/Relationship to Patient  Date   Time    _____________________________________________________________________________   Name (if used)    Language/Organization   Time    _____________________________________________________________________________  Nurse Anesthetist Signature     Date   Time  _____________________________________________________________________________  Anesthesiologist Signature     Date   Time  I have discussed the procedure and information above with the patient (or patient’s representative) and answered their questions. The patient or their representative has agreed to have anesthesia services.    _____________________________________________________________________________  Witness        Date   Time  I have verified that the signature is that of the patient or patient’s representative, and that it was signed before the procedure  Patient Name: Karen Sprague     : 4/10/1935                 Printed: 4/10/2024 at 4:31 PM    Medical Record #: [id number]                                            Page 1 of 1  ----------ANESTHESIA CONSENT----------